# Patient Record
Sex: MALE | Race: WHITE | Employment: UNEMPLOYED | URBAN - METROPOLITAN AREA
[De-identification: names, ages, dates, MRNs, and addresses within clinical notes are randomized per-mention and may not be internally consistent; named-entity substitution may affect disease eponyms.]

---

## 2018-10-08 ENCOUNTER — HOSPITAL ENCOUNTER (OUTPATIENT)
Dept: CT IMAGING | Age: 53
Discharge: HOME | End: 2018-10-08
Attending: SURGERY
Payer: COMMERCIAL

## 2018-10-08 DIAGNOSIS — I70.222 ATHEROSCLEROSIS OF NATIVE ARTERY OF LEFT LOWER EXTREMITY WITH REST PAIN (HCC): ICD-10-CM

## 2018-10-08 DIAGNOSIS — I99.8 ISCHEMIC: ICD-10-CM

## 2018-10-08 LAB — CREAT BLD-MCNC: 1.1 MG/DL (ref 0.8–1.5)

## 2018-10-08 PROCEDURE — 75635 CT ANGIO ABDOMINAL ARTERIES: CPT

## 2018-10-08 PROCEDURE — 82565 ASSAY OF CREATININE: CPT

## 2018-10-08 PROCEDURE — 74011000258 HC RX REV CODE- 258: Performed by: SURGERY

## 2018-10-08 PROCEDURE — 74011636320 HC RX REV CODE- 636/320: Performed by: SURGERY

## 2018-10-08 RX ORDER — SODIUM CHLORIDE 0.9 % (FLUSH) 0.9 %
10 SYRINGE (ML) INJECTION
Status: COMPLETED | OUTPATIENT
Start: 2018-10-08 | End: 2018-10-08

## 2018-10-08 RX ADMIN — IOPAMIDOL 125 ML: 755 INJECTION, SOLUTION INTRAVENOUS at 17:01

## 2018-10-08 RX ADMIN — SODIUM CHLORIDE 100 ML: 900 INJECTION, SOLUTION INTRAVENOUS at 17:01

## 2018-10-08 RX ADMIN — Medication 10 ML: at 17:01

## 2018-10-15 RX ORDER — SODIUM CHLORIDE 0.9 % (FLUSH) 0.9 %
5-10 SYRINGE (ML) INJECTION AS NEEDED
Status: CANCELLED | OUTPATIENT
Start: 2018-10-15

## 2018-10-15 RX ORDER — CEFAZOLIN SODIUM/WATER 2 G/20 ML
2 SYRINGE (ML) INTRAVENOUS ONCE
Status: CANCELLED | OUTPATIENT
Start: 2018-10-15 | End: 2018-10-15

## 2018-10-19 ENCOUNTER — HOSPITAL ENCOUNTER (OUTPATIENT)
Dept: SURGERY | Age: 53
Discharge: HOME | End: 2018-10-19
Payer: COMMERCIAL

## 2018-10-19 ENCOUNTER — ANESTHESIA EVENT (OUTPATIENT)
Dept: SURGERY | Age: 53
DRG: 169 | End: 2018-10-19
Payer: COMMERCIAL

## 2018-10-19 VITALS
RESPIRATION RATE: 20 BRPM | SYSTOLIC BLOOD PRESSURE: 151 MMHG | BODY MASS INDEX: 24.1 KG/M2 | HEART RATE: 56 BPM | OXYGEN SATURATION: 97 % | HEIGHT: 68 IN | WEIGHT: 159 LBS | DIASTOLIC BLOOD PRESSURE: 87 MMHG | TEMPERATURE: 98 F

## 2018-10-19 LAB
ANION GAP SERPL CALC-SCNC: 5 MMOL/L (ref 7–16)
ATRIAL RATE: 50 BPM
BUN SERPL-MCNC: 13 MG/DL (ref 6–23)
CALCIUM SERPL-MCNC: 8.7 MG/DL (ref 8.3–10.4)
CALCULATED P AXIS, ECG09: 71 DEGREES
CALCULATED R AXIS, ECG10: 66 DEGREES
CALCULATED T AXIS, ECG11: 65 DEGREES
CHLORIDE SERPL-SCNC: 105 MMOL/L (ref 98–107)
CO2 SERPL-SCNC: 29 MMOL/L (ref 21–32)
CREAT SERPL-MCNC: 0.97 MG/DL (ref 0.8–1.5)
DIAGNOSIS, 93000: NORMAL
ERYTHROCYTE [DISTWIDTH] IN BLOOD BY AUTOMATED COUNT: 12.7 %
GLUCOSE SERPL-MCNC: 86 MG/DL (ref 65–100)
HCT VFR BLD AUTO: 46.6 % (ref 41.1–50.3)
HGB BLD-MCNC: 15.1 G/DL (ref 13.6–17.2)
MCH RBC QN AUTO: 29.7 PG (ref 26.1–32.9)
MCHC RBC AUTO-ENTMCNC: 32.4 G/DL (ref 31.4–35)
MCV RBC AUTO: 91.7 FL (ref 79.6–97.8)
NRBC # BLD: 0 K/UL (ref 0–0.2)
P-R INTERVAL, ECG05: 136 MS
PLATELET # BLD AUTO: 240 K/UL (ref 150–450)
PMV BLD AUTO: 10 FL (ref 9.4–12.3)
POTASSIUM SERPL-SCNC: 4.8 MMOL/L (ref 3.5–5.1)
Q-T INTERVAL, ECG07: 470 MS
QRS DURATION, ECG06: 88 MS
QTC CALCULATION (BEZET), ECG08: 428 MS
RBC # BLD AUTO: 5.08 M/UL (ref 4.23–5.6)
SODIUM SERPL-SCNC: 139 MMOL/L (ref 136–145)
VENTRICULAR RATE, ECG03: 50 BPM
WBC # BLD AUTO: 8.2 K/UL (ref 4.3–11.1)

## 2018-10-19 PROCEDURE — 93005 ELECTROCARDIOGRAM TRACING: CPT | Performed by: ANESTHESIOLOGY

## 2018-10-19 PROCEDURE — 85027 COMPLETE CBC AUTOMATED: CPT

## 2018-10-19 PROCEDURE — 80048 BASIC METABOLIC PNL TOTAL CA: CPT

## 2018-10-19 NOTE — ANESTHESIA PREPROCEDURE EVALUATION
Anesthetic History Review of Systems / Medical History Patient summary reviewed, nursing notes reviewed and pertinent labs reviewed Pulmonary COPD (chronic bronchitis): moderate Smoker (2ppd; now down to 6 cigarettes qd) Neuro/Psych  
 
 
CVA (approx 18 months ago. L hemiparesis improving.) Cardiovascular Hypertension Hyperlipidemia Exercise tolerance: <4 METS: Limited by claudication Comments: Echo 2017 - normal EF, no sig valve dysfxn Carotid duplex 2017 - no sig stenosis GI/Hepatic/Renal 
  
 
 
 
 
 
 Endo/Other Other Findings Physical Exam 
 
Airway Mallampati: I 
TM Distance: > 6 cm Neck ROM: normal range of motion Mouth opening: Normal 
 
 Cardiovascular Regular rate and rhythm,  S1 and S2 normal,  no murmur, click, rub, or gallop Dental 
 
Dentition: Full lower dentures and Full upper dentures Pulmonary Breath sounds clear to auscultation Abdominal 
GI exam deferred Other Findings Anesthetic Plan ASA: 3 Anesthesia type: general 
 
Monitoring Plan: Arterial line Post-op pain plan if not by surgeon: indwelling epidural catheter Anesthetic plan and risks discussed with: Patient Girlfriend present

## 2018-10-19 NOTE — PERIOP NOTES
Patient verified name and . Patient provided medical/health information and PTA medications to the best of their ability. TYPE  CASE:3  Order for consent was found in EHR and matches case posting. Labs per surgeon:cbc, bmp-- these collected today and sent to lab. Labs per anesthesia protocol: type and screen dos-- order placed in cc  EKG  :  today  Dr Ricks Primer in to see pt  Patient provided with and instructed on education handouts including Guide to Surgery, blood transfusions, pain management, and hand hygiene for the family and community, and [facility] brochure. dawnmist and hibiclens and instructions given per hospital policy. Instructed patient to continue previous medications as prescribed prior to surgery unless otherwise directed and to take the following medications the day of surgery according to anesthesia guidelines : baclofen, celexa, klonopin, norco, lopressor,  . Instructed patient to hold  the following medications: OF NOTE--PT TAKING  MG-- PER DR YORK ORDERS SET IN CC CONTINUE ASA-- BUT UNSURE IF HE IS AWARE THAT IT  MG-- CALLED OFFICE SPOKE TO Frankey Huff M.D. IN SURG BUT SHE WOULD FIND OUT AND CALL PT AND EITHER CALL ME OR PLACE NOTE IN CC---- G' FRIEND WITH PT--- MADE AWARE IF THEY HAD NOT HEARD BY 1400 TODAY TO CALL OFFICE AGAIN--- # GIVEN TO PT AND G' FRIEND. Original medication prescription bottles WAS  visualized during patient appointment. Patient teach back successful and patient demonstrates knowledge of instruction.

## 2018-10-19 NOTE — PERIOP NOTES
Melody Medrano r.n. rec'd call from Maria R from dr Mary Kasper office--dr tarah molina with pt staying on asa 325 mg daily--- called pt's g' friend-- best contact #-- left detail message and our # as return call if needed

## 2018-10-25 RX ORDER — HYDROMORPHONE HYDROCHLORIDE 2 MG/ML
0.5 INJECTION, SOLUTION INTRAMUSCULAR; INTRAVENOUS; SUBCUTANEOUS
Status: CANCELLED | OUTPATIENT
Start: 2018-10-25

## 2018-10-25 RX ORDER — SODIUM CHLORIDE, SODIUM LACTATE, POTASSIUM CHLORIDE, CALCIUM CHLORIDE 600; 310; 30; 20 MG/100ML; MG/100ML; MG/100ML; MG/100ML
75 INJECTION, SOLUTION INTRAVENOUS CONTINUOUS
Status: CANCELLED | OUTPATIENT
Start: 2018-10-25

## 2018-10-25 RX ORDER — OXYCODONE HYDROCHLORIDE 5 MG/1
10 TABLET ORAL
Status: CANCELLED | OUTPATIENT
Start: 2018-10-25 | End: 2018-10-26

## 2018-10-25 RX ORDER — FLUMAZENIL 0.1 MG/ML
0.2 INJECTION INTRAVENOUS AS NEEDED
Status: CANCELLED | OUTPATIENT
Start: 2018-10-25

## 2018-10-25 RX ORDER — DIPHENHYDRAMINE HYDROCHLORIDE 50 MG/ML
12.5 INJECTION, SOLUTION INTRAMUSCULAR; INTRAVENOUS
Status: CANCELLED | OUTPATIENT
Start: 2018-10-25

## 2018-10-25 RX ORDER — NALOXONE HYDROCHLORIDE 0.4 MG/ML
0.1 INJECTION, SOLUTION INTRAMUSCULAR; INTRAVENOUS; SUBCUTANEOUS
Status: CANCELLED | OUTPATIENT
Start: 2018-10-25

## 2018-10-25 RX ORDER — OXYCODONE HYDROCHLORIDE 5 MG/1
5 TABLET ORAL
Status: CANCELLED | OUTPATIENT
Start: 2018-10-25 | End: 2018-10-26

## 2018-10-26 ENCOUNTER — HOSPITAL ENCOUNTER (INPATIENT)
Age: 53
LOS: 5 days | Discharge: HOME | DRG: 169 | End: 2018-10-31
Attending: SURGERY | Admitting: SURGERY
Payer: COMMERCIAL

## 2018-10-26 ENCOUNTER — ANESTHESIA (OUTPATIENT)
Dept: SURGERY | Age: 53
DRG: 169 | End: 2018-10-26
Payer: COMMERCIAL

## 2018-10-26 DIAGNOSIS — Z95.828 S/P AORTOBIFEMORAL BYPASS SURGERY: Primary | ICD-10-CM

## 2018-10-26 PROBLEM — I73.9 PAD (PERIPHERAL ARTERY DISEASE) (HCC): Status: ACTIVE | Noted: 2018-10-26

## 2018-10-26 LAB
ABO + RH BLD: NORMAL
ACT BLD: 114 SECS (ref 70–128)
ACT BLD: 180 SECS (ref 70–128)
ANION GAP SERPL CALC-SCNC: 9 MMOL/L (ref 7–16)
APTT PPP: 30.2 SEC (ref 23.2–35.3)
BASE DEFICIT BLD-SCNC: 2 MMOL/L
BASE DEFICIT BLD-SCNC: 3 MMOL/L
BLOOD GROUP ANTIBODIES SERPL: NORMAL
BUN SERPL-MCNC: 18 MG/DL (ref 6–23)
CA-I BLD-MCNC: 1.14 MMOL/L (ref 1.12–1.32)
CA-I BLD-MCNC: 1.19 MMOL/L (ref 1.12–1.32)
CALCIUM SERPL-MCNC: 7.7 MG/DL (ref 8.3–10.4)
CHLORIDE SERPL-SCNC: 108 MMOL/L (ref 98–107)
CO2 SERPL-SCNC: 23 MMOL/L (ref 21–32)
CREAT SERPL-MCNC: 0.98 MG/DL (ref 0.8–1.5)
ERYTHROCYTE [DISTWIDTH] IN BLOOD BY AUTOMATED COUNT: 12.8 %
GLUCOSE BLD STRIP.AUTO-MCNC: 103 MG/DL (ref 65–100)
GLUCOSE BLD STRIP.AUTO-MCNC: 121 MG/DL (ref 65–100)
GLUCOSE SERPL-MCNC: 156 MG/DL (ref 65–100)
HCO3 BLD-SCNC: 24 MMOL/L (ref 22–26)
HCO3 BLD-SCNC: 24.3 MMOL/L (ref 22–26)
HCT VFR BLD AUTO: 38.7 % (ref 41.1–50.3)
HGB BLD-MCNC: 12.6 G/DL (ref 13.6–17.2)
INR PPP: 1
MAGNESIUM SERPL-MCNC: 1.8 MG/DL (ref 1.8–2.4)
MCH RBC QN AUTO: 29.5 PG (ref 26.1–32.9)
MCHC RBC AUTO-ENTMCNC: 32.6 G/DL (ref 31.4–35)
MCV RBC AUTO: 90.6 FL (ref 79.6–97.8)
NRBC # BLD: 0 K/UL (ref 0–0.2)
PCO2 BLD: 46 MMHG (ref 35–45)
PCO2 BLD: 50 MMHG (ref 35–45)
PH BLD: 7.29 [PH] (ref 7.35–7.45)
PH BLD: 7.33 [PH] (ref 7.35–7.45)
PLATELET # BLD AUTO: 239 K/UL (ref 150–450)
PMV BLD AUTO: 9.8 FL (ref 9.4–12.3)
PO2 BLD: 135 MMHG (ref 75–100)
PO2 BLD: 152 MMHG (ref 75–100)
POTASSIUM BLD-SCNC: 4.7 MMOL/L (ref 3.5–5.1)
POTASSIUM BLD-SCNC: 5.2 MMOL/L (ref 3.5–5.1)
POTASSIUM SERPL-SCNC: 3.8 MMOL/L (ref 3.5–5.1)
PROTHROMBIN TIME: 12.8 SEC (ref 11.5–14.5)
RBC # BLD AUTO: 4.27 M/UL (ref 4.23–5.6)
SAO2 % BLD: 99 % (ref 95–98)
SAO2 % BLD: 99 % (ref 95–98)
SODIUM BLD-SCNC: 139 MMOL/L (ref 136–145)
SODIUM BLD-SCNC: 140 MMOL/L (ref 136–145)
SODIUM SERPL-SCNC: 140 MMOL/L (ref 136–145)
SPECIMEN EXP DATE BLD: NORMAL
WBC # BLD AUTO: 15.4 K/UL (ref 4.3–11.1)

## 2018-10-26 PROCEDURE — 77030007880 HC KT SPN EPDRL BBMI -B: Performed by: ANESTHESIOLOGY

## 2018-10-26 PROCEDURE — 77030018548 HC SUT ETHBND2 J&J -B: Performed by: SURGERY

## 2018-10-26 PROCEDURE — P9045 ALBUMIN (HUMAN), 5%, 250 ML: HCPCS

## 2018-10-26 PROCEDURE — 74011250636 HC RX REV CODE- 250/636: Performed by: SURGERY

## 2018-10-26 PROCEDURE — 77030037400 HC ADH TISS HI VISC EXOFIN CHMP -B: Performed by: SURGERY

## 2018-10-26 PROCEDURE — 04100JK BYPASS ABDOMINAL AORTA TO BILATERAL FEMORAL ARTERIES WITH SYNTHETIC SUBSTITUTE, OPEN APPROACH: ICD-10-PCS | Performed by: SURGERY

## 2018-10-26 PROCEDURE — 85027 COMPLETE CBC AUTOMATED: CPT

## 2018-10-26 PROCEDURE — 77030020751 HC FLTR TBNG TRNSFUS HAEM -A: Performed by: ANESTHESIOLOGY

## 2018-10-26 PROCEDURE — 76060000040 HC ANESTHESIA 4.5 TO 5 HR: Performed by: SURGERY

## 2018-10-26 PROCEDURE — 74011000250 HC RX REV CODE- 250: Performed by: SURGERY

## 2018-10-26 PROCEDURE — 82803 BLOOD GASES ANY COMBINATION: CPT

## 2018-10-26 PROCEDURE — 74011000258 HC RX REV CODE- 258: Performed by: SURGERY

## 2018-10-26 PROCEDURE — 77030039266 HC ADH SKN EXOFIN S2SG -A: Performed by: SURGERY

## 2018-10-26 PROCEDURE — 77030003544 HC NDL EPDRL BD -A: Performed by: ANESTHESIOLOGY

## 2018-10-26 PROCEDURE — 74011250636 HC RX REV CODE- 250/636

## 2018-10-26 PROCEDURE — C1768 GRAFT, VASCULAR: HCPCS | Performed by: SURGERY

## 2018-10-26 PROCEDURE — 77030019940 HC BLNKT HYPOTHRM STRY -B: Performed by: ANESTHESIOLOGY

## 2018-10-26 PROCEDURE — 77030010512 HC APPL CLP LIG J&J -C: Performed by: SURGERY

## 2018-10-26 PROCEDURE — 77030020407 HC IV BLD WRMR ST 3M -A: Performed by: ANESTHESIOLOGY

## 2018-10-26 PROCEDURE — 77030005515 HC CATH URETH FOL14 BARD -B: Performed by: SURGERY

## 2018-10-26 PROCEDURE — 77030020230: Performed by: ANESTHESIOLOGY

## 2018-10-26 PROCEDURE — 77030011264 HC ELECTRD BLD EXT COVD -A: Performed by: SURGERY

## 2018-10-26 PROCEDURE — 77030019908 HC STETH ESOPH SIMS -A: Performed by: ANESTHESIOLOGY

## 2018-10-26 PROCEDURE — 77030014125 HC TY EPDRL BBMI -B: Performed by: ANESTHESIOLOGY

## 2018-10-26 PROCEDURE — 74011250637 HC RX REV CODE- 250/637: Performed by: ANESTHESIOLOGY

## 2018-10-26 PROCEDURE — 77030012317 HC CATH URET INT COVD -A: Performed by: SURGERY

## 2018-10-26 PROCEDURE — 77030020788: Performed by: SURGERY

## 2018-10-26 PROCEDURE — 77030002996 HC SUT SLK J&J -A: Performed by: SURGERY

## 2018-10-26 PROCEDURE — 77030002970 HC SUT PLEDG TELE -A: Performed by: SURGERY

## 2018-10-26 PROCEDURE — 77030008467 HC STPLR SKN COVD -B: Performed by: SURGERY

## 2018-10-26 PROCEDURE — 77030031139 HC SUT VCRL2 J&J -A: Performed by: SURGERY

## 2018-10-26 PROCEDURE — 77030008477 HC STYL SATN SLP COVD -A: Performed by: ANESTHESIOLOGY

## 2018-10-26 PROCEDURE — 77030039425 HC BLD LARYNG TRULITE DISP TELE -A: Performed by: ANESTHESIOLOGY

## 2018-10-26 PROCEDURE — 76010000965 HC SPECIAL PROCEDURE PERI-OP: Performed by: SURGERY

## 2018-10-26 PROCEDURE — 77030002987 HC SUT PROL J&J -B: Performed by: SURGERY

## 2018-10-26 PROCEDURE — 77030013292 HC BOWL MX PRSM J&J -A: Performed by: ANESTHESIOLOGY

## 2018-10-26 PROCEDURE — 76010000199 HC CV SURG 4.5 TO 5 HR INTENSV-TIER 1: Performed by: SURGERY

## 2018-10-26 PROCEDURE — 77030034850: Performed by: SURGERY

## 2018-10-26 PROCEDURE — 77030002986 HC SUT PROL J&J -A: Performed by: SURGERY

## 2018-10-26 PROCEDURE — 83735 ASSAY OF MAGNESIUM: CPT

## 2018-10-26 PROCEDURE — 77030018673: Performed by: SURGERY

## 2018-10-26 PROCEDURE — 77030013794 HC KT TRNSDUC BLD EDWD -B: Performed by: ANESTHESIOLOGY

## 2018-10-26 PROCEDURE — 85730 THROMBOPLASTIN TIME PARTIAL: CPT

## 2018-10-26 PROCEDURE — 36600 WITHDRAWAL OF ARTERIAL BLOOD: CPT

## 2018-10-26 PROCEDURE — 74011000250 HC RX REV CODE- 250

## 2018-10-26 PROCEDURE — 77030020782 HC GWN BAIR PAWS FLX 3M -B: Performed by: ANESTHESIOLOGY

## 2018-10-26 PROCEDURE — 77030002966 HC SUT PDS J&J -A: Performed by: SURGERY

## 2018-10-26 PROCEDURE — 85610 PROTHROMBIN TIME: CPT

## 2018-10-26 PROCEDURE — 84295 ASSAY OF SERUM SODIUM: CPT

## 2018-10-26 PROCEDURE — 74011250636 HC RX REV CODE- 250/636: Performed by: ANESTHESIOLOGY

## 2018-10-26 PROCEDURE — 77030011943: Performed by: SURGERY

## 2018-10-26 PROCEDURE — 65610000006 HC RM INTENSIVE CARE

## 2018-10-26 PROCEDURE — 77030014008 HC SPNG HEMSTAT J&J -C: Performed by: SURGERY

## 2018-10-26 PROCEDURE — 86901 BLOOD TYPING SEROLOGIC RH(D): CPT

## 2018-10-26 PROCEDURE — 85347 COAGULATION TIME ACTIVATED: CPT

## 2018-10-26 PROCEDURE — 77030018571 HC SUT PROL1 J&J -B: Performed by: SURGERY

## 2018-10-26 PROCEDURE — 77030034888 HC SUT PROL 2 J&J -B: Performed by: SURGERY

## 2018-10-26 PROCEDURE — 80048 BASIC METABOLIC PNL TOTAL CA: CPT

## 2018-10-26 PROCEDURE — 77030008703 HC TU ET UNCUF COVD -A: Performed by: ANESTHESIOLOGY

## 2018-10-26 DEVICE — GELSOFT GELATIN IMPREGNATED KNITTED VASCULAR PROSTHESIS BIFURCATE
Type: IMPLANTABLE DEVICE | Site: AORTA | Status: FUNCTIONAL
Brand: GELSOFT™

## 2018-10-26 RX ORDER — DOPAMINE HYDROCHLORIDE 320 MG/100ML
5 INJECTION, SOLUTION INTRAVENOUS
Status: DISCONTINUED | OUTPATIENT
Start: 2018-10-26 | End: 2018-10-28

## 2018-10-26 RX ORDER — NALOXONE HYDROCHLORIDE 0.4 MG/ML
0.4 INJECTION, SOLUTION INTRAMUSCULAR; INTRAVENOUS; SUBCUTANEOUS AS NEEDED
Status: DISCONTINUED | OUTPATIENT
Start: 2018-10-26 | End: 2018-10-31 | Stop reason: HOSPADM

## 2018-10-26 RX ORDER — SODIUM CHLORIDE 0.9 % (FLUSH) 0.9 %
5-10 SYRINGE (ML) INJECTION AS NEEDED
Status: DISCONTINUED | OUTPATIENT
Start: 2018-10-26 | End: 2018-10-31

## 2018-10-26 RX ORDER — KETOROLAC TROMETHAMINE 30 MG/ML
15 INJECTION, SOLUTION INTRAMUSCULAR; INTRAVENOUS EVERY 6 HOURS
Status: COMPLETED | OUTPATIENT
Start: 2018-10-26 | End: 2018-10-27

## 2018-10-26 RX ORDER — KETOROLAC TROMETHAMINE 15 MG/ML
15 INJECTION, SOLUTION INTRAMUSCULAR; INTRAVENOUS
Status: ACTIVE | OUTPATIENT
Start: 2018-10-26 | End: 2018-10-27

## 2018-10-26 RX ORDER — HEPARIN SODIUM 1000 [USP'U]/ML
INJECTION, SOLUTION INTRAVENOUS; SUBCUTANEOUS AS NEEDED
Status: DISCONTINUED | OUTPATIENT
Start: 2018-10-26 | End: 2018-10-26 | Stop reason: HOSPADM

## 2018-10-26 RX ORDER — DEXTROSE MONOHYDRATE AND SODIUM CHLORIDE 5; .45 G/100ML; G/100ML
1000 INJECTION, SOLUTION INTRAVENOUS CONTINUOUS
Status: DISCONTINUED | OUTPATIENT
Start: 2018-10-26 | End: 2018-10-27

## 2018-10-26 RX ORDER — MAGNESIUM SULFATE 1 G/100ML
1 INJECTION INTRAVENOUS AS NEEDED
Status: DISCONTINUED | OUTPATIENT
Start: 2018-10-26 | End: 2018-10-31 | Stop reason: HOSPADM

## 2018-10-26 RX ORDER — EPHEDRINE SULFATE 50 MG/ML
INJECTION, SOLUTION INTRAVENOUS AS NEEDED
Status: DISCONTINUED | OUTPATIENT
Start: 2018-10-26 | End: 2018-10-26 | Stop reason: HOSPADM

## 2018-10-26 RX ORDER — LIDOCAINE HYDROCHLORIDE 20 MG/ML
INJECTION, SOLUTION EPIDURAL; INFILTRATION; INTRACAUDAL; PERINEURAL AS NEEDED
Status: DISCONTINUED | OUTPATIENT
Start: 2018-10-26 | End: 2018-10-26 | Stop reason: HOSPADM

## 2018-10-26 RX ORDER — DEXAMETHASONE SODIUM PHOSPHATE 4 MG/ML
INJECTION, SOLUTION INTRA-ARTICULAR; INTRALESIONAL; INTRAMUSCULAR; INTRAVENOUS; SOFT TISSUE AS NEEDED
Status: DISCONTINUED | OUTPATIENT
Start: 2018-10-26 | End: 2018-10-26 | Stop reason: HOSPADM

## 2018-10-26 RX ORDER — MIDAZOLAM HYDROCHLORIDE 1 MG/ML
2 INJECTION, SOLUTION INTRAMUSCULAR; INTRAVENOUS ONCE
Status: COMPLETED | OUTPATIENT
Start: 2018-10-26 | End: 2018-10-26

## 2018-10-26 RX ORDER — NALOXONE HYDROCHLORIDE 0.4 MG/ML
0.1 INJECTION, SOLUTION INTRAMUSCULAR; INTRAVENOUS; SUBCUTANEOUS AS NEEDED
Status: DISCONTINUED | OUTPATIENT
Start: 2018-10-26 | End: 2018-10-31 | Stop reason: HOSPADM

## 2018-10-26 RX ORDER — GLYCOPYRROLATE 0.2 MG/ML
INJECTION INTRAMUSCULAR; INTRAVENOUS AS NEEDED
Status: DISCONTINUED | OUTPATIENT
Start: 2018-10-26 | End: 2018-10-26 | Stop reason: HOSPADM

## 2018-10-26 RX ORDER — HYDROMORPHONE HYDROCHLORIDE 1 MG/ML
0.25 INJECTION, SOLUTION INTRAMUSCULAR; INTRAVENOUS; SUBCUTANEOUS
Status: DISCONTINUED | OUTPATIENT
Start: 2018-10-26 | End: 2018-10-31 | Stop reason: HOSPADM

## 2018-10-26 RX ORDER — NEOSTIGMINE METHYLSULFATE 1 MG/ML
INJECTION INTRAVENOUS AS NEEDED
Status: DISCONTINUED | OUTPATIENT
Start: 2018-10-26 | End: 2018-10-26 | Stop reason: HOSPADM

## 2018-10-26 RX ORDER — FENTANYL CITRATE 50 UG/ML
INJECTION, SOLUTION INTRAMUSCULAR; INTRAVENOUS AS NEEDED
Status: DISCONTINUED | OUTPATIENT
Start: 2018-10-26 | End: 2018-10-26 | Stop reason: HOSPADM

## 2018-10-26 RX ORDER — SODIUM CHLORIDE 9 MG/ML
INJECTION, SOLUTION INTRAVENOUS
Status: DISCONTINUED | OUTPATIENT
Start: 2018-10-26 | End: 2018-10-26 | Stop reason: HOSPADM

## 2018-10-26 RX ORDER — OXYCODONE HYDROCHLORIDE 5 MG/1
10 TABLET ORAL
Status: DISCONTINUED | OUTPATIENT
Start: 2018-10-26 | End: 2018-10-26

## 2018-10-26 RX ORDER — METOPROLOL TARTRATE 5 MG/5ML
5 INJECTION INTRAVENOUS EVERY 6 HOURS
Status: DISCONTINUED | OUTPATIENT
Start: 2018-10-26 | End: 2018-10-31 | Stop reason: SDUPTHER

## 2018-10-26 RX ORDER — CEFAZOLIN SODIUM/WATER 2 G/20 ML
2 SYRINGE (ML) INTRAVENOUS ONCE
Status: COMPLETED | OUTPATIENT
Start: 2018-10-26 | End: 2018-10-26

## 2018-10-26 RX ORDER — SODIUM CHLORIDE 0.9 % (FLUSH) 0.9 %
5-10 SYRINGE (ML) INJECTION AS NEEDED
Status: DISCONTINUED | OUTPATIENT
Start: 2018-10-26 | End: 2018-10-31 | Stop reason: HOSPADM

## 2018-10-26 RX ORDER — PROPOFOL 10 MG/ML
INJECTION, EMULSION INTRAVENOUS AS NEEDED
Status: DISCONTINUED | OUTPATIENT
Start: 2018-10-26 | End: 2018-10-26 | Stop reason: HOSPADM

## 2018-10-26 RX ORDER — ONDANSETRON 2 MG/ML
4 INJECTION INTRAMUSCULAR; INTRAVENOUS
Status: DISPENSED | OUTPATIENT
Start: 2018-10-26 | End: 2018-10-29

## 2018-10-26 RX ORDER — MIDAZOLAM HYDROCHLORIDE 1 MG/ML
2 INJECTION, SOLUTION INTRAMUSCULAR; INTRAVENOUS
Status: COMPLETED | OUTPATIENT
Start: 2018-10-26 | End: 2018-10-26

## 2018-10-26 RX ORDER — ONDANSETRON 2 MG/ML
4 INJECTION INTRAMUSCULAR; INTRAVENOUS
Status: DISCONTINUED | OUTPATIENT
Start: 2018-10-26 | End: 2018-10-31 | Stop reason: HOSPADM

## 2018-10-26 RX ORDER — BUPIVACAINE HYDROCHLORIDE 2.5 MG/ML
INJECTION, SOLUTION EPIDURAL; INFILTRATION; INTRACAUDAL AS NEEDED
Status: DISCONTINUED | OUTPATIENT
Start: 2018-10-26 | End: 2018-10-26 | Stop reason: HOSPADM

## 2018-10-26 RX ORDER — HYDROMORPHONE HYDROCHLORIDE 1 MG/ML
0.5 INJECTION, SOLUTION INTRAMUSCULAR; INTRAVENOUS; SUBCUTANEOUS
Status: DISCONTINUED | OUTPATIENT
Start: 2018-10-26 | End: 2018-10-31 | Stop reason: HOSPADM

## 2018-10-26 RX ORDER — ALBUMIN HUMAN 50 G/1000ML
SOLUTION INTRAVENOUS AS NEEDED
Status: DISCONTINUED | OUTPATIENT
Start: 2018-10-26 | End: 2018-10-26 | Stop reason: HOSPADM

## 2018-10-26 RX ORDER — HEPARIN SODIUM 5000 [USP'U]/ML
INJECTION, SOLUTION INTRAVENOUS; SUBCUTANEOUS AS NEEDED
Status: DISCONTINUED | OUTPATIENT
Start: 2018-10-26 | End: 2018-10-26 | Stop reason: HOSPADM

## 2018-10-26 RX ORDER — LIDOCAINE HYDROCHLORIDE AND EPINEPHRINE 15; 5 MG/ML; UG/ML
INJECTION, SOLUTION EPIDURAL
Status: COMPLETED | OUTPATIENT
Start: 2018-10-26 | End: 2018-10-26

## 2018-10-26 RX ORDER — ROCURONIUM BROMIDE 10 MG/ML
INJECTION, SOLUTION INTRAVENOUS AS NEEDED
Status: DISCONTINUED | OUTPATIENT
Start: 2018-10-26 | End: 2018-10-26 | Stop reason: HOSPADM

## 2018-10-26 RX ORDER — SODIUM CHLORIDE, SODIUM LACTATE, POTASSIUM CHLORIDE, CALCIUM CHLORIDE 600; 310; 30; 20 MG/100ML; MG/100ML; MG/100ML; MG/100ML
75 INJECTION, SOLUTION INTRAVENOUS CONTINUOUS
Status: DISCONTINUED | OUTPATIENT
Start: 2018-10-26 | End: 2018-10-26

## 2018-10-26 RX ORDER — LIDOCAINE HYDROCHLORIDE 10 MG/ML
0.1 INJECTION INFILTRATION; PERINEURAL AS NEEDED
Status: DISCONTINUED | OUTPATIENT
Start: 2018-10-26 | End: 2018-10-31 | Stop reason: HOSPADM

## 2018-10-26 RX ORDER — CEFAZOLIN SODIUM 1 G/3ML
INJECTION, POWDER, FOR SOLUTION INTRAMUSCULAR; INTRAVENOUS AS NEEDED
Status: DISCONTINUED | OUTPATIENT
Start: 2018-10-26 | End: 2018-10-26 | Stop reason: HOSPADM

## 2018-10-26 RX ORDER — ONDANSETRON 2 MG/ML
INJECTION INTRAMUSCULAR; INTRAVENOUS AS NEEDED
Status: DISCONTINUED | OUTPATIENT
Start: 2018-10-26 | End: 2018-10-26 | Stop reason: HOSPADM

## 2018-10-26 RX ORDER — GABAPENTIN 300 MG/1
600 CAPSULE ORAL ONCE
Status: COMPLETED | OUTPATIENT
Start: 2018-10-26 | End: 2018-10-26

## 2018-10-26 RX ORDER — PROTAMINE SULFATE 10 MG/ML
INJECTION, SOLUTION INTRAVENOUS AS NEEDED
Status: DISCONTINUED | OUTPATIENT
Start: 2018-10-26 | End: 2018-10-26 | Stop reason: HOSPADM

## 2018-10-26 RX ORDER — SODIUM CHLORIDE 0.9 % (FLUSH) 0.9 %
5-10 SYRINGE (ML) INJECTION EVERY 8 HOURS
Status: DISCONTINUED | OUTPATIENT
Start: 2018-10-26 | End: 2018-10-31 | Stop reason: HOSPADM

## 2018-10-26 RX ORDER — FENTANYL CITRATE 50 UG/ML
100 INJECTION, SOLUTION INTRAMUSCULAR; INTRAVENOUS ONCE
Status: COMPLETED | OUTPATIENT
Start: 2018-10-26 | End: 2018-10-26

## 2018-10-26 RX ORDER — SODIUM CHLORIDE 0.9 % (FLUSH) 0.9 %
5-10 SYRINGE (ML) INJECTION EVERY 8 HOURS
Status: DISCONTINUED | OUTPATIENT
Start: 2018-10-26 | End: 2018-10-31

## 2018-10-26 RX ADMIN — FENTANYL CITRATE 25 MCG: 50 INJECTION, SOLUTION INTRAMUSCULAR; INTRAVENOUS at 07:30

## 2018-10-26 RX ADMIN — HEPARIN SODIUM 5000 UNITS: 1000 INJECTION, SOLUTION INTRAVENOUS; SUBCUTANEOUS at 10:32

## 2018-10-26 RX ADMIN — Medication 10 ML: at 21:08

## 2018-10-26 RX ADMIN — BUPIVACAINE HYDROCHLORIDE 2 ML: 2.5 INJECTION, SOLUTION EPIDURAL; INFILTRATION; INTRACAUDAL at 11:25

## 2018-10-26 RX ADMIN — KETOROLAC TROMETHAMINE 15 MG: 30 INJECTION, SOLUTION INTRAMUSCULAR at 17:32

## 2018-10-26 RX ADMIN — ONDANSETRON 4 MG: 2 INJECTION INTRAMUSCULAR; INTRAVENOUS at 08:16

## 2018-10-26 RX ADMIN — LIDOCAINE HYDROCHLORIDE AND EPINEPHRINE 3 ML: 15; 5 INJECTION, SOLUTION EPIDURAL at 07:02

## 2018-10-26 RX ADMIN — FENTANYL CITRATE 100 MCG: 50 INJECTION, SOLUTION INTRAMUSCULAR; INTRAVENOUS at 08:36

## 2018-10-26 RX ADMIN — ROCURONIUM BROMIDE 10 MG: 10 INJECTION, SOLUTION INTRAVENOUS at 09:00

## 2018-10-26 RX ADMIN — FENTANYL CITRATE 75 MCG: 50 INJECTION, SOLUTION INTRAMUSCULAR; INTRAVENOUS at 07:38

## 2018-10-26 RX ADMIN — ROCURONIUM BROMIDE 40 MG: 10 INJECTION, SOLUTION INTRAVENOUS at 07:39

## 2018-10-26 RX ADMIN — MIDAZOLAM HYDROCHLORIDE 1 MG: 2 INJECTION, SOLUTION INTRAMUSCULAR; INTRAVENOUS at 07:01

## 2018-10-26 RX ADMIN — DEXAMETHASONE SODIUM PHOSPHATE 4 MG: 4 INJECTION, SOLUTION INTRA-ARTICULAR; INTRALESIONAL; INTRAMUSCULAR; INTRAVENOUS; SOFT TISSUE at 08:16

## 2018-10-26 RX ADMIN — CEFAZOLIN SODIUM 2 G: 1 INJECTION, POWDER, FOR SOLUTION INTRAMUSCULAR; INTRAVENOUS at 11:18

## 2018-10-26 RX ADMIN — METOPROLOL TARTRATE 5 MG: 1 INJECTION, SOLUTION INTRAVENOUS at 17:31

## 2018-10-26 RX ADMIN — ROCURONIUM BROMIDE 10 MG: 10 INJECTION, SOLUTION INTRAVENOUS at 09:59

## 2018-10-26 RX ADMIN — GABAPENTIN 600 MG: 300 CAPSULE ORAL at 05:58

## 2018-10-26 RX ADMIN — BUPIVACAINE HYDROCHLORIDE 4 ML: 2.5 INJECTION, SOLUTION EPIDURAL; INFILTRATION; INTRACAUDAL at 11:15

## 2018-10-26 RX ADMIN — ROPIVACAINE HYDROCHLORIDE 6 MCG: 10 INJECTION, SOLUTION EPIDURAL at 11:41

## 2018-10-26 RX ADMIN — SODIUM CHLORIDE: 9 INJECTION, SOLUTION INTRAVENOUS at 07:43

## 2018-10-26 RX ADMIN — PROTAMINE SULFATE 20 MG: 10 INJECTION, SOLUTION INTRAVENOUS at 10:57

## 2018-10-26 RX ADMIN — PROPOFOL 120 MG: 10 INJECTION, EMULSION INTRAVENOUS at 07:38

## 2018-10-26 RX ADMIN — SODIUM CHLORIDE 1000 MG: 900 INJECTION, SOLUTION INTRAVENOUS at 18:51

## 2018-10-26 RX ADMIN — ROCURONIUM BROMIDE 10 MG: 10 INJECTION, SOLUTION INTRAVENOUS at 09:37

## 2018-10-26 RX ADMIN — HEPARIN SODIUM 7000 UNITS: 1000 INJECTION, SOLUTION INTRAVENOUS; SUBCUTANEOUS at 09:28

## 2018-10-26 RX ADMIN — EPHEDRINE SULFATE 10 MG: 50 INJECTION, SOLUTION INTRAVENOUS at 07:56

## 2018-10-26 RX ADMIN — NICARDIPINE HYDROCHLORIDE 5 MG/HR: 25 INJECTION INTRAVENOUS at 15:36

## 2018-10-26 RX ADMIN — FENTANYL CITRATE 100 MCG: 50 INJECTION, SOLUTION INTRAMUSCULAR; INTRAVENOUS at 08:48

## 2018-10-26 RX ADMIN — ROCURONIUM BROMIDE 10 MG: 10 INJECTION, SOLUTION INTRAVENOUS at 09:16

## 2018-10-26 RX ADMIN — HYDROMORPHONE HYDROCHLORIDE 0.25 MG: 2 INJECTION, SOLUTION INTRAMUSCULAR; INTRAVENOUS; SUBCUTANEOUS at 15:35

## 2018-10-26 RX ADMIN — PROTAMINE SULFATE 20 MG: 10 INJECTION, SOLUTION INTRAVENOUS at 10:58

## 2018-10-26 RX ADMIN — ALBUMIN HUMAN 250 ML: 50 SOLUTION INTRAVENOUS at 10:34

## 2018-10-26 RX ADMIN — ALBUMIN HUMAN 250 ML: 50 SOLUTION INTRAVENOUS at 10:06

## 2018-10-26 RX ADMIN — ROCURONIUM BROMIDE 20 MG: 10 INJECTION, SOLUTION INTRAVENOUS at 08:00

## 2018-10-26 RX ADMIN — ROPIVACAINE HYDROCHLORIDE 6 MCG: 10 INJECTION, SOLUTION EPIDURAL at 12:00

## 2018-10-26 RX ADMIN — EPHEDRINE SULFATE 5 MG: 50 INJECTION, SOLUTION INTRAVENOUS at 07:48

## 2018-10-26 RX ADMIN — GLYCOPYRROLATE 0.2 MG: 0.2 INJECTION INTRAMUSCULAR; INTRAVENOUS at 08:07

## 2018-10-26 RX ADMIN — MIDAZOLAM HYDROCHLORIDE 2 MG: 2 INJECTION, SOLUTION INTRAMUSCULAR; INTRAVENOUS at 06:49

## 2018-10-26 RX ADMIN — Medication 10 ML: at 14:00

## 2018-10-26 RX ADMIN — FENTANYL CITRATE 100 MCG: 50 INJECTION INTRAMUSCULAR; INTRAVENOUS at 06:49

## 2018-10-26 RX ADMIN — BUPIVACAINE HYDROCHLORIDE 4 ML: 2.5 INJECTION, SOLUTION EPIDURAL; INFILTRATION; INTRACAUDAL at 11:01

## 2018-10-26 RX ADMIN — DEXTROSE MONOHYDRATE AND SODIUM CHLORIDE 1000 ML: 5; .45 INJECTION, SOLUTION INTRAVENOUS at 13:00

## 2018-10-26 RX ADMIN — LIDOCAINE HYDROCHLORIDE 80 MG: 20 INJECTION, SOLUTION EPIDURAL; INFILTRATION; INTRACAUDAL; PERINEURAL at 07:38

## 2018-10-26 RX ADMIN — Medication 2 G: at 08:00

## 2018-10-26 RX ADMIN — SODIUM CHLORIDE: 9 INJECTION, SOLUTION INTRAVENOUS at 09:55

## 2018-10-26 RX ADMIN — SODIUM CHLORIDE, SODIUM LACTATE, POTASSIUM CHLORIDE, AND CALCIUM CHLORIDE 75 ML/HR: 600; 310; 30; 20 INJECTION, SOLUTION INTRAVENOUS at 05:57

## 2018-10-26 RX ADMIN — ROCURONIUM BROMIDE 20 MG: 10 INJECTION, SOLUTION INTRAVENOUS at 08:39

## 2018-10-26 RX ADMIN — FENTANYL CITRATE 100 MCG: 50 INJECTION, SOLUTION INTRAMUSCULAR; INTRAVENOUS at 08:55

## 2018-10-26 RX ADMIN — GLYCOPYRROLATE 0.8 MG: 0.2 INJECTION INTRAMUSCULAR; INTRAVENOUS at 11:34

## 2018-10-26 RX ADMIN — PROTAMINE SULFATE 10 MG: 10 INJECTION, SOLUTION INTRAVENOUS at 10:56

## 2018-10-26 RX ADMIN — NEOSTIGMINE METHYLSULFATE 5 MG: 1 INJECTION INTRAVENOUS at 11:34

## 2018-10-26 RX ADMIN — MAGNESIUM SULFATE HEPTAHYDRATE 1 G: 1 INJECTION, SOLUTION INTRAVENOUS at 17:32

## 2018-10-26 RX ADMIN — ROCURONIUM BROMIDE 5 MG: 10 INJECTION, SOLUTION INTRAVENOUS at 11:22

## 2018-10-26 RX ADMIN — EPHEDRINE SULFATE 5 MG: 50 INJECTION, SOLUTION INTRAVENOUS at 07:47

## 2018-10-26 NOTE — H&P
[address] [facility] [address], [address] [facility]. [address] [facility] FAX: [phone] Sara Castellon 1965 Referring physician: ERNESTINA Rice 
[address] [facility], [address] [facility] 
  
Reason for referral: Left leg ischemia 
  
Odilia Nuñez is a 46 y.o. male sent in consultation for Chief Complaint Patient presents with  New Patient  
    u/s arterial   
  
  
HPI: 58-year-old male that presents for evaluation of an ischemic left leg. He is also history of cigarette smoking and according to him he had a crush injury on the job where he feels like he occluded the femoral artery on the left. The details of his treatment for their own became fairly unclear however does sound like he was treated [facility] and had some stents placed in the left iliac system. He developed new discomfort and coolness as well as numbness in the left leg and foot mostly below the knee and came back for further evaluation. He had an ultrasound done this morning that demonstrated the left iliac system to be occluded. He complains primarily of numbness and also has pain in his left foot as well at rest. 
  
He also had what sounds like a right hemispheric stroke that left him with left hemiparesis with some residual symptoms on the left side and the left hand where he has a mild degree of clumsiness and he also has foot drop on the left. I believe that the foot drop was a result of the stroke rather than chronic ischemia. However despite that he is able to walk with the assistance of an AFO. He really continues to work. Unfortunately continues to smoke but he is cut down to what he describes about 4 cigarettes/day. He denies any cardiopulmonary disease or previous coronary issues. He lives in [place] he is accompanied today by his wife. 
  
  
ROS: 
  
Constitutional: Negative for fever and chills. HENT: Negative for congestion and sore throat. Skin: Negative for rash and itching. Eyes: Negative for blurred vision and double vision. Respiratory: Negative for cough and shortness of breath. Cardiovascular: Negative for chest pain, palpitations, claudication and leg swelling. Gastrointestinal: Negative for nausea, vomiting and abdominal pain. Genitourinary: Negative for dysuria, hematuria and flank pain. Musculoskeletal: Negative for joint pain and falls. Neurological: Negative for dizziness, sensory change, focal weakness, loss of consciousness and headaches.  
  
Medical history:  
    
Past Medical History:  
Diagnosis Date  Anxiety disorder    
 Chronic pain    
 CVA (cerebral vascular accident) ([facility] [address] 03/2017  Depression    
 Hernia, inguinal    
  Not repaired  Hypercholesterolemia    
 Hypertension    
 Psychotic disorder [facility])    
  
  
Surgical history:  
     
Past Surgical History:  
Procedure Laterality Date  VASCULAR SURGERY PROCEDURE UNLIST Left    
  LE stenting x4  
  
  
Allergies: No Known Allergies 
  
Current medications:  
     
Current Outpatient Prescriptions Medication Sig Dispense  aspirin (ASPIRIN) 325 mg tablet Take 325 mg by mouth daily.    
 atorvastatin (LIPITOR) 40 mg tablet Take  by mouth daily.    
 baclofen (LIORESAL) 10 mg tablet Take  by mouth three (3) times daily.    
 citalopram (CELEXA) 40 mg tablet Take 40 mg by mouth daily.    
 clonazePAM (KLONOPIN) 1 mg tablet Take  by mouth two (2) times a day.    
 cyclobenzaprine (FLEXERIL) 10 mg tablet Take  by mouth three (3) times daily as needed for Muscle Spasm(s).    
 HYDROcodone-acetaminophen (NORCO) 7.5-325 mg per tablet Take 1 Tab by mouth every eight (8) hours as needed for Pain.    
 lisinopril (PRINIVIL, ZESTRIL) 2.5 mg tablet Take 5 mg by mouth daily. Indications:  Take 1/2 tab AM and take 1 Tab Bedtime    
 metoprolol tartrate (LOPRESSOR) 25 mg tablet Take  by mouth two (2) times a day.    
  nitroglycerin (NITROSTAT) 0.4 mg SL tablet 0.4 mg by SubLINGual route every five (5) minutes as needed for Chest Pain (PRN). Up to 3 doses.    
  
No current facility-administered medications for this visit.   
  
  
Social history:  
   
History Smoking Status  Current Every Day Smoker Smokeless Tobacco  
 Never Used  
  
                         
   
History Alcohol use Not on file  
  
  
Imaging: See above, the arterial duplex imaging rates her resting ankle-brachial index of 0.7 on the left. 
  
Vitals:  
   
Vitals:  
  10/08/18 1412 Weight: 161 lb (73 kg) Height: 5' 6\" (1.676 m)  
  
  
Physical exam: 
    
Visit Vitals  Ht 5' 6\" (1.676 m)  Wt 161 lb (73 kg)  BMI 25.99 kg/m2  
  
General appearance: alert, cooperative, no distress, appears stated age Throat: Lips, mucosa, and tongue normal. Teeth and gums normal 
Neck: supple, symmetrical, trachea midline, no adenopathy, thyroid: not enlarged, symmetric, no tenderness/mass/nodules, no carotid bruit and no JVD Lungs: clear to auscultation bilaterally Heart: regular rate and rhythm, S1, S2 normal, no murmur, click, rub or gallop Abdomen: soft, non-tender. Bowel sounds normal. No masses,  no organomegaly Extremities: extremities normal, atraumatic, no cyanosis or edema Skin: Skin color, texture, turgor normal. No rashes or lesions, left foot is cyanotic and cool. There are no skin lesions. Neurologic: Grossly normal with exception of numbness affecting the left foot as well as foot drop on that side. Vascular Exam: 
  
Right:                                       LEFT:                                                                           
                                                 
       Radial: 2+                                     Radial: 2+ Brachial: 2+                                  Brachial: 2+ Femoral: 2+                                  Femoral: Absent Popliteal: 2+                                 Popliteal: Absent DP: 1+                                          DP: Absent PT: 1+                                          PT: Absent Carotid Bruit: No                          Carotid Bruit: No 
  
Impression:  
    ICD-10-CM ICD-9-CM    
1. Atherosclerosis of native artery of left lower extremity with rest pain ([facility]) I70.222 440.22 CTA ABD ART W RUNOFF W WO CONT  
  
  
Plan: I am going to order a CTA of the abdomen and lower extremities. I suspect that his iliac system on the left is occluded. Given the fact he has had occlusion of recently placed stents on the side he may be better suited with a direct surgical reconstruction. We will look at the images and decide the best option for him for a long-term durable option. I discussed this with the patient and his wife in the room today and they indicated their understanding and are in agreement with our plan of management moving forward. I will plan to call them once I have had opportunity to review the CTA images as they are living in [place] and will try to avoid him having to drive back and forth to often. On review of the CTA, I have recommended an AORTO-BIFEMORAL BYPASS. I have discussed the need for the procedure with the patient and family. I have discussed the procedure with the patient/family in detail today including but not limited to the risk of death, bleeding requiring transfusion, infection, limb loss, or the necessity of subsequent procedures. All questions were answered. They understand and agree to proceed. Shantanu Hernandez MD 
 
Elements of this note have been dictated using speech recognition software.  As a result, errors of speech recognition may have occurred. 
  
  
Approximately 30 minutes  was spent in direct patient contact during this encounter including 50% of which was spend in patient education, counseling, review of medical history and imaging, and medical decision making.  
  
  
Rowena Mortimer, MD

## 2018-10-26 NOTE — ANESTHESIA POSTPROCEDURE EVALUATION
Procedure(s): 
AORTA BI FEMORAL BYPASS. Anesthesia Post Evaluation Multimodal analgesia: multimodal analgesia used between 6 hours prior to anesthesia start to PACU discharge Patient location during evaluation: ICU Patient participation: complete - patient participated Level of consciousness: awake Pain management: adequate Airway patency: patent Anesthetic complications: no 
Cardiovascular status: acceptable and hemodynamically stable Respiratory status: acceptable Hydration status: acceptable Comments: Acceptable for discharge from PACU. Visit Vitals /69 Pulse 82 Temp 36.1 °C (97 °F) Resp (!) 0 Ht 5' 6\" (1.676 m) Wt 69.8 kg (153 lb 12.8 oz) SpO2 92% BMI 24.82 kg/m²

## 2018-10-26 NOTE — ANESTHESIA PROCEDURE NOTES
Arterial Line Placement Start time: 10/26/2018 7:30 AM 
End time: 10/26/2018 7:34 AM 
Performed by: Mecca Armstrong CRNA Authorized by: Mecca Armstrong CRNA Pre-Procedure Indications:  Arterial pressure monitoring and blood sampling Preanesthetic Checklist: patient identified, risks and benefits discussed, anesthesia consent, site marked, patient being monitored, timeout performed and patient being monitored Timeout Time: 07:30 Procedure:  
Prep:  Chlorhexidine Seldinger Technique?: No   
Orientation:  Left Location:  Radial artery Catheter size:  20 G Number of attempts:  1 Cont Cardiac Output Sensor: No   
 
Assessment:  
Post-procedure:  Line secured and sterile dressing applied Patient Tolerance:  Patient tolerated the procedure well with no immediate complications

## 2018-10-26 NOTE — ANESTHESIA PROCEDURE NOTES
Epidural Block Start time: 10/26/2018 6:49 AM 
End time: 10/26/2018 7:04 AM 
Performed by: Cal Silva MD 
Authorized by: Cal Silva MD  
 
Pre-Procedure Indication: labor epidural   
Preanesthetic Checklist: patient identified, risks and benefits discussed, anesthesia consent, site marked, patient being monitored, timeout performed and anesthesia consent Timeout Time: 06:48 Epidural:  
Patient position:  Seated Prep region:  Lumbar Prep: Patient draped and Chlorhexidine Location:  T11-12 Needle and Epidural Catheter:  
Needle Type:  Tuohy Needle Gauge:  19 G Injection Technique:  Loss of resistance using saline Attempts:  2 Catheter Size:  19 G Catheter at Skin Depth (cm):  15 Depth in Epidural Space (cm):  7 Events: no blood with aspiration, no cerebrospinal fluid with aspiration, no paresthesia and negative aspiration test   
Test Dose:  Lidocaine 1.5% w/ epi and negative Assessment:  
Catheter Secured:  Tegaderm and tape Insertion:  Uncomplicated Patient tolerance:  Patient tolerated the procedure well with no immediate complications

## 2018-10-26 NOTE — PROGRESS NOTES
TRANSFER - IN REPORT: 
 
Verbal report received from [facility]) on Myrna Conde  being received from OR(unit) for routine progression of care Report consisted of patients Situation, Background, Assessment and  
Recommendations(SBAR). Information from the following report(s) SBAR, OR Summary, Intake/Output, MAR and Cardiac Rhythm SR was reviewed with the receiving nurse. Opportunity for questions and clarification was provided. Received to [facility] Assessment completed upon patients arrival to unit and care assumed.

## 2018-10-26 NOTE — PERIOP NOTES
Attempted to update family from O.R, friend not available, left message with Alvaro Rod, will try back at 21 [phone]

## 2018-10-26 NOTE — BRIEF OP NOTE
BRIEF OPERATIVE NOTE Date of Procedure: 10/26/2018 Preoperative Diagnosis: Atherosclerosis of native artery of left lower extremity with rest pain ([facility] [address] [I70.222] Postoperative Diagnosis: Atherosclerosis of native artery of left lower extremity with rest pain ([address] [I70.222] Procedure(s): 
AORTA BI FEMORAL BYPASS Surgeon(s) and Role: Fili Gilbert MD - Primary Susana Baumann MD - Assisting Surgical Assistant: Michael Tadeo [facility] Surgical Staff: 
Circ-1: Vivek Kaye RN 
Circ-2: Henri Reeder RN 
Circ-Relief: Ximena Pineda RN Physician Assistant: ERNESTINA Freed Scrub Tech-1: Elissa Simmons Scrub Tech-2: Bryanna Law Scrub Tech-3: Fletcher Erazo Event Time In Time Out Incision Start 2153 Incision Close Anesthesia: General  
Estimated Blood Loss: 250cc Specimens: * No specimens in log * Findings:   
Complications: None Implants:  
Implant Name Type Inv. Item Serial No.  Lot No. LRB No. Used Action GRAFT VASC BIFUR [id number] -- Maridee [place]  GRAFT VASC BIFUR [id number] -- Emerson Lobe [id number] [facility] CARDIOVASCULAR [id number]  1 Implanted

## 2018-10-27 LAB
ANION GAP SERPL CALC-SCNC: 6 MMOL/L (ref 7–16)
ANION GAP SERPL CALC-SCNC: 8 MMOL/L (ref 7–16)
BUN SERPL-MCNC: 14 MG/DL (ref 6–23)
BUN SERPL-MCNC: 17 MG/DL (ref 6–23)
CALCIUM SERPL-MCNC: 7.2 MG/DL (ref 8.3–10.4)
CALCIUM SERPL-MCNC: 7.7 MG/DL (ref 8.3–10.4)
CHLORIDE SERPL-SCNC: 107 MMOL/L (ref 98–107)
CHLORIDE SERPL-SCNC: 109 MMOL/L (ref 98–107)
CO2 SERPL-SCNC: 24 MMOL/L (ref 21–32)
CO2 SERPL-SCNC: 26 MMOL/L (ref 21–32)
CREAT SERPL-MCNC: 0.9 MG/DL (ref 0.8–1.5)
CREAT SERPL-MCNC: 1.13 MG/DL (ref 0.8–1.5)
ERYTHROCYTE [DISTWIDTH] IN BLOOD BY AUTOMATED COUNT: 13.1 %
ERYTHROCYTE [DISTWIDTH] IN BLOOD BY AUTOMATED COUNT: 13.2 %
GLUCOSE SERPL-MCNC: 124 MG/DL (ref 65–100)
GLUCOSE SERPL-MCNC: 129 MG/DL (ref 65–100)
HCT VFR BLD AUTO: 30.2 % (ref 41.1–50.3)
HCT VFR BLD AUTO: 30.5 % (ref 41.1–50.3)
HGB BLD-MCNC: 9.8 G/DL (ref 13.6–17.2)
HGB BLD-MCNC: 9.9 G/DL (ref 13.6–17.2)
MCH RBC QN AUTO: 29.4 PG (ref 26.1–32.9)
MCH RBC QN AUTO: 29.5 PG (ref 26.1–32.9)
MCHC RBC AUTO-ENTMCNC: 32.5 G/DL (ref 31.4–35)
MCHC RBC AUTO-ENTMCNC: 32.5 G/DL (ref 31.4–35)
MCV RBC AUTO: 90.7 FL (ref 79.6–97.8)
MCV RBC AUTO: 90.8 FL (ref 79.6–97.8)
NRBC # BLD: 0 K/UL (ref 0–0.2)
NRBC # BLD: 0 K/UL (ref 0–0.2)
PLATELET # BLD AUTO: 161 K/UL (ref 150–450)
PLATELET # BLD AUTO: 201 K/UL (ref 150–450)
PMV BLD AUTO: 10 FL (ref 9.4–12.3)
PMV BLD AUTO: 9.8 FL (ref 9.4–12.3)
POTASSIUM SERPL-SCNC: 4 MMOL/L (ref 3.5–5.1)
POTASSIUM SERPL-SCNC: 4.4 MMOL/L (ref 3.5–5.1)
RBC # BLD AUTO: 3.33 M/UL (ref 4.23–5.6)
RBC # BLD AUTO: 3.36 M/UL (ref 4.23–5.6)
SODIUM SERPL-SCNC: 139 MMOL/L (ref 136–145)
SODIUM SERPL-SCNC: 141 MMOL/L (ref 136–145)
WBC # BLD AUTO: 11.4 K/UL (ref 4.3–11.1)
WBC # BLD AUTO: 15.3 K/UL (ref 4.3–11.1)

## 2018-10-27 PROCEDURE — 80048 BASIC METABOLIC PNL TOTAL CA: CPT

## 2018-10-27 PROCEDURE — 74011250636 HC RX REV CODE- 250/636: Performed by: SURGERY

## 2018-10-27 PROCEDURE — 65610000006 HC RM INTENSIVE CARE

## 2018-10-27 PROCEDURE — 36600 WITHDRAWAL OF ARTERIAL BLOOD: CPT

## 2018-10-27 PROCEDURE — 74011000258 HC RX REV CODE- 258: Performed by: SURGERY

## 2018-10-27 PROCEDURE — 74011250636 HC RX REV CODE- 250/636: Performed by: ANESTHESIOLOGY

## 2018-10-27 PROCEDURE — 74011250637 HC RX REV CODE- 250/637: Performed by: SURGERY

## 2018-10-27 PROCEDURE — 85027 COMPLETE CBC AUTOMATED: CPT

## 2018-10-27 PROCEDURE — 77030020263 HC SOL INJ SOD CL0.9% LFCR 1000ML

## 2018-10-27 PROCEDURE — 74011000250 HC RX REV CODE- 250: Performed by: SURGERY

## 2018-10-27 RX ORDER — DEXTROSE MONOHYDRATE AND SODIUM CHLORIDE 5; .45 G/100ML; G/100ML
75 INJECTION, SOLUTION INTRAVENOUS CONTINUOUS
Status: DISCONTINUED | OUTPATIENT
Start: 2018-10-27 | End: 2018-10-31 | Stop reason: HOSPADM

## 2018-10-27 RX ORDER — KETOROLAC TROMETHAMINE 15 MG/ML
15 INJECTION, SOLUTION INTRAMUSCULAR; INTRAVENOUS
Status: COMPLETED | OUTPATIENT
Start: 2018-10-27 | End: 2018-10-27

## 2018-10-27 RX ORDER — ACETAMINOPHEN 325 MG/1
650 TABLET ORAL
Status: DISCONTINUED | OUTPATIENT
Start: 2018-10-27 | End: 2018-10-31 | Stop reason: HOSPADM

## 2018-10-27 RX ORDER — HEPARIN SODIUM 5000 [USP'U]/ML
5000 INJECTION, SOLUTION INTRAVENOUS; SUBCUTANEOUS EVERY 8 HOURS
Status: DISCONTINUED | OUTPATIENT
Start: 2018-10-27 | End: 2018-10-31 | Stop reason: HOSPADM

## 2018-10-27 RX ADMIN — Medication 10 ML: at 13:02

## 2018-10-27 RX ADMIN — SODIUM CHLORIDE 1000 MG: 900 INJECTION, SOLUTION INTRAVENOUS at 05:30

## 2018-10-27 RX ADMIN — KETOROLAC TROMETHAMINE 15 MG: 30 INJECTION, SOLUTION INTRAMUSCULAR at 00:21

## 2018-10-27 RX ADMIN — Medication 5 ML: at 06:00

## 2018-10-27 RX ADMIN — METOPROLOL TARTRATE 5 MG: 1 INJECTION, SOLUTION INTRAVENOUS at 12:25

## 2018-10-27 RX ADMIN — HEPARIN SODIUM 5000 UNITS: 5000 INJECTION INTRAVENOUS; SUBCUTANEOUS at 19:31

## 2018-10-27 RX ADMIN — HYDROMORPHONE HYDROCHLORIDE 0.25 MG: 2 INJECTION, SOLUTION INTRAMUSCULAR; INTRAVENOUS; SUBCUTANEOUS at 08:16

## 2018-10-27 RX ADMIN — PHENYLEPHRINE HYDROCHLORIDE 40 MCG/MIN: 10 INJECTION INTRAVENOUS at 01:30

## 2018-10-27 RX ADMIN — METOPROLOL TARTRATE 5 MG: 1 INJECTION, SOLUTION INTRAVENOUS at 23:58

## 2018-10-27 RX ADMIN — Medication 10 ML: at 21:26

## 2018-10-27 RX ADMIN — HYDROMORPHONE HYDROCHLORIDE 0.5 MG: 1 INJECTION, SOLUTION INTRAMUSCULAR; INTRAVENOUS; SUBCUTANEOUS at 17:13

## 2018-10-27 RX ADMIN — HEPARIN SODIUM 5000 UNITS: 5000 INJECTION INTRAVENOUS; SUBCUTANEOUS at 12:25

## 2018-10-27 RX ADMIN — DEXTROSE MONOHYDRATE AND SODIUM CHLORIDE 75 ML/HR: 5; .45 INJECTION, SOLUTION INTRAVENOUS at 12:25

## 2018-10-27 RX ADMIN — HYDROMORPHONE HYDROCHLORIDE 0.5 MG: 1 INJECTION, SOLUTION INTRAMUSCULAR; INTRAVENOUS; SUBCUTANEOUS at 04:41

## 2018-10-27 RX ADMIN — KETOROLAC TROMETHAMINE 15 MG: 15 INJECTION, SOLUTION INTRAMUSCULAR; INTRAVENOUS at 17:31

## 2018-10-27 RX ADMIN — HYDROMORPHONE HYDROCHLORIDE 0.5 MG: 1 INJECTION, SOLUTION INTRAMUSCULAR; INTRAVENOUS; SUBCUTANEOUS at 09:51

## 2018-10-27 RX ADMIN — ACETAMINOPHEN 650 MG: 325 TABLET ORAL at 14:24

## 2018-10-27 RX ADMIN — KETOROLAC TROMETHAMINE 15 MG: 30 INJECTION, SOLUTION INTRAMUSCULAR at 05:33

## 2018-10-27 RX ADMIN — METOPROLOL TARTRATE 5 MG: 1 INJECTION, SOLUTION INTRAVENOUS at 17:19

## 2018-10-27 RX ADMIN — Medication 10 ML: at 08:09

## 2018-10-27 RX ADMIN — HYDROMORPHONE HYDROCHLORIDE 0.5 MG: 1 INJECTION, SOLUTION INTRAMUSCULAR; INTRAVENOUS; SUBCUTANEOUS at 14:30

## 2018-10-27 NOTE — PROGRESS NOTES
[address], [address] [place] [address]. [address] [place] [address] FAX: [phone] VASCULAR SURGERY FLOOR PROGRESS NOTE Admit Date: 10/26/2018 POD: 1 Day Post-Op Procedure:  Procedure(s): 
AORTA BI FEMORAL BYPASS Subjective:  
 
Patient has no new complaints. Objective:  
 
Vitals: 
Blood pressure 129/72, pulse (!) 116, temperature 98.7 °F (37.1 °C), resp. rate 19, height 5' 6\" (1.676 m), weight 153 lb 12.8 oz (69.8 kg), SpO2 92 %. Temp (24hrs), Av.9 °F (36.6 °C), Min:97 °F (36.1 °C), Max:99.1 °F (37.3 °C) Intake / Output: 
 
Intake/Output Summary (Last 24 hours) at 10/27/2018 1027 Last data filed at 10/27/2018 5904 Gross per 24 hour Intake 2553.2 ml Output 1790 ml Net 763.2 ml Physical Exam:   
Constitutional: he appears well-developed. No distress. HENT:  
Head: Atraumatic. Eyes: Pupils are equal, round, and reactive to light. Neck: Normal range of motion. Cardiovascular: Regular rhythm. Pulmonary/Chest: Effort normal and breath sounds normal. No respiratory distress. Abdominal: Soft. Bowel sounds are normal. he exhibits no distension. There is no tenderness. There is no guarding. No hernia. Musculoskeletal: Normal range of motion. Neurological: He is alert. CN II- XII grossly intact Vascular: Postop dressing intact palpable pedal pulses Labs:  
Recent Labs 10/27/18 
0202 10/26/18 
1325 HGB 9.9* 12.6* WBC 15.3* 15.4*  
K 4.4 3.8 * 156* Data Review Assessment:  
 
Patient Active Problem List  
 Diagnosis Date Noted  PAD (peripheral artery disease) ([facility] [address] 10/26/2018 Plan/Recommendations/Medical Decision Making:  
 
Patient hemodynamically stable slightly tachycardic we will continue IV Lopressor to help with the tachycardic no evidence of any bleeding, will recheck CBC at noon 
-We will continue increased dose of the epidural hypotension has been resolved Can have ice chips- 
 -we will plan for keep in ICU overnight tonight and to remove epidural and Humphrey catheter tomorrow morning Elements of this note have been dictated using speech recognition software. As a result, errors of speech recognition may have occurred.

## 2018-10-27 NOTE — PROGRESS NOTES
Visit with patient to build rapport with . Patient is calm. Receptive to  presence. Encouraged and assured patient of our continued care. Jean Carlos Chen,  Staff  C: [phone]  /  [email or web address]

## 2018-10-27 NOTE — PROGRESS NOTES
Bedside and Verbal shift change report received from [address] [place] [address]  by Brittney Alexandre RN. Report with SBAR, Procedure Summary, Intake/Output, MAR and Recent Results.

## 2018-10-27 NOTE — OP NOTES
[facility] REPORT    Mckenna Cash  MR#: [id number]  : 1965  ACCOUNT #: [de-identified]   DATE OF SERVICE: 10/26/2018    PREOPERATIVE DIAGNOSIS:  Bilateral lower extremity ischemia. POSTOPERATIVE DIAGNOSIS:  Bilateral lower extremity ischemia. PROCEDURE PERFORMED:  Aortobifemoral bypass. SURGEON:  Shantanu Hernandez MD    ASSISTANT:  Denisha Osorio PA-C    ANESTHESIA:  General endotracheal plus Epidural.    ESTIMATED BLOOD LOSS:  250 mL. DRAINS:  None. SPECIMENS REMOVED:  None. COMPLICATIONS:  None. IMPLANTS:  See chart. DESCRIPTION OF PROCEDURE:  The patient was brought to operating room and placed on the operating table in supine position. Following adequate general endotracheal anesthesia and establishment of an epidural catheter for postoperative pain management the anterior abdomen was draped and prepped in sterile fashion. A time-out procedure was performed. Vertical incisions were made in the groins. The wounds were deepened using Bovie, sharp blade, the common femoral, profunda and SFAs were identified, mobilized and encircled with vessel loops on each side. Next, subinguinal tunnels were created in the retroperitoneum from both sides to facilitate tunneling of the graft later in the procedure. At this point wet lap sponges were placed in these wounds. Next, a vertical midline incision was made. The wound was deepened and used Bovie to control bleeding. The fascia was entered in the midline and opened throughout the length of the wound. The peritoneal cavity was inspected for abnormalities and there were none seen. Next, the transverse colon was reflected superiorly and the small bowel was eviscerated to the patient's right and protected with warm moist towels. The retroperitoneum overlying the aorta was incised longitudinally. The abdominal aorta was identified up to the level just below the renal arteries.   It was slotted on either side in preparation for cross-clamping. Next, the distal aorta similarly was mobilized in preparation for clamping. The patient was then systemically heparinized. Next, proximal aortic clamp was placed followed by distal aortic clamp. A longitudinal arteriotomy was performed. A small portion of the anterior aortic wall was excised. The 14 x 7 Dacron graft was then spatulated and sewn into position in end-to-side fashion with a running 3-0 Prolene suture. Following completion of the anastomosis a clamp was placed on the graft and the proximal and distal clamps were released. There was minimal bleeding from the anastomotic suture lines. Next, the limbs of the graft were tunneled in the retroperitoneum using previously placed red Robinul catheters. Care was taken not to twist the tunneled graft limbs. Next, on the right an end-to-side anastomosis was created with a running 5-0 Prolene suture. Prior to completion of the anastomosis native vessels were flushed and the graft was flushed as well. Anastomosis was then completed and flow restored. Similarly, on the left and end-to-side anastomosis was created with a running 5-0 Prolene suture. Upon completion of the anastomosis the vessels and graft were flushed. Anastomosis then completed and flow restored. At this point, there was excellent flow distally and palpable pedal pulses. Protamine was administered to reverse heparin effect. The wounds were then inspected for bleeding and none was seen. All wounds were then closed in layers of Vicryl suture. The abdomen was closed with skin staples and the femoral wounds were closed with subcuticular suture. Sterile dry dressings were applied. The patient was awakened from anesthesia and transported to ICU in stable condition. The patient tolerated the procedure well. All needle and sponge counts were correct.       MD Nilda Hodge / [place]  D: 10/26/2018 16:01     T: 10/27/2018 00:54  JOB #: [id number]

## 2018-10-27 NOTE — CDMP QUERY
Please clarify if this patient is being treated/managed for: 
ACUTE BLOOD LOSS ANEMIA in the setting of an aortobifemoral bypass surgery treating with  frequent monitoring of Hgb  
 
 
 
=>Other Explanation of clinical findings =>Unable to Determine (no explanation of clinical findings) The medical record reflects the following: 
 
Risk Factors: s/p aortobifemoral bypass on 10/26 Clinical Indicators: hgb on admission 12.6 post surgery hgb 9.9 on 10/27, EBL 250ml, tachycardic, Treatment: Recheck CBC, serial lab Please clarify and document your clinical opinion in the progress notes and discharge summary including the definitive and/or presumptive diagnosis, (suspected or probable), related to the above clinical findings. Please include clinical findings supporting your diagnosis. Thanks, Radha Hua RN Compliant Documentation Management Program 
[phone]

## 2018-10-27 NOTE — PROGRESS NOTES
Report per SBAR at bedside, epidural pump is beeping air gloria line. Pharmacy called and here at bedside.

## 2018-10-27 NOTE — PROGRESS NOTES
Spoke with Dr. Oumar Quinones updated on Pt condition BP dropped 79/51, NS bolus infusing and Phenylephrine gtt started per order, BP improving. Order's received, Epidural stopped and morning labs to be drawn now, will continue to monitor closely.

## 2018-10-27 NOTE — PROGRESS NOTES
Anesthesiology Epidural Follow-up Post-op day 1. S/p Aorto Bi Fem. Pain is absent with epidural infusion. Blood pressure low over night so infusion stopped. Visit Vitals /75 Pulse (!) 103 Temp 37.3 °C (99.1 °F) Resp 16 Ht 5' 6\" (1.676 m) Comment: Pt stated Wt 69.8 kg (153 lb 12.8 oz) SpO2 95% BMI 24.82 kg/m² Clerance Riroselia No motor weakness. Epidural site ok with dressing intact. Will follow. Will restart at half rate today.

## 2018-10-28 LAB
ANION GAP SERPL CALC-SCNC: 6 MMOL/L (ref 7–16)
BUN SERPL-MCNC: 12 MG/DL (ref 6–23)
CALCIUM SERPL-MCNC: 7.8 MG/DL (ref 8.3–10.4)
CHLORIDE SERPL-SCNC: 106 MMOL/L (ref 98–107)
CO2 SERPL-SCNC: 26 MMOL/L (ref 21–32)
CREAT SERPL-MCNC: 0.88 MG/DL (ref 0.8–1.5)
ERYTHROCYTE [DISTWIDTH] IN BLOOD BY AUTOMATED COUNT: 12.9 %
GLUCOSE SERPL-MCNC: 112 MG/DL (ref 65–100)
HCT VFR BLD AUTO: 28.7 % (ref 41.1–50.3)
HGB BLD-MCNC: 9.3 G/DL (ref 13.6–17.2)
MCH RBC QN AUTO: 29.4 PG (ref 26.1–32.9)
MCHC RBC AUTO-ENTMCNC: 32.4 G/DL (ref 31.4–35)
MCV RBC AUTO: 90.8 FL (ref 79.6–97.8)
NRBC # BLD: 0 K/UL (ref 0–0.2)
PLATELET # BLD AUTO: 140 K/UL (ref 150–450)
PMV BLD AUTO: 9.7 FL (ref 9.4–12.3)
POTASSIUM SERPL-SCNC: 3.9 MMOL/L (ref 3.5–5.1)
RBC # BLD AUTO: 3.16 M/UL (ref 4.23–5.6)
SODIUM SERPL-SCNC: 138 MMOL/L (ref 136–145)
WBC # BLD AUTO: 12.7 K/UL (ref 4.3–11.1)

## 2018-10-28 PROCEDURE — 65660000004 HC RM CVT STEPDOWN

## 2018-10-28 PROCEDURE — 77030020253 HC SOL INJ D545NS .05 DEX .45 SAL

## 2018-10-28 PROCEDURE — 77030032994 HC SOL INJ SOD CL 0.9% LFCR 500ML

## 2018-10-28 PROCEDURE — 36600 WITHDRAWAL OF ARTERIAL BLOOD: CPT

## 2018-10-28 PROCEDURE — 74011000258 HC RX REV CODE- 258: Performed by: SURGERY

## 2018-10-28 PROCEDURE — 74011250637 HC RX REV CODE- 250/637: Performed by: SURGERY

## 2018-10-28 PROCEDURE — 74011250636 HC RX REV CODE- 250/636: Performed by: ANESTHESIOLOGY

## 2018-10-28 PROCEDURE — 85027 COMPLETE CBC AUTOMATED: CPT

## 2018-10-28 PROCEDURE — 74011000250 HC RX REV CODE- 250: Performed by: SURGERY

## 2018-10-28 PROCEDURE — 74011250636 HC RX REV CODE- 250/636: Performed by: SURGERY

## 2018-10-28 PROCEDURE — 97161 PT EVAL LOW COMPLEX 20 MIN: CPT

## 2018-10-28 PROCEDURE — 80048 BASIC METABOLIC PNL TOTAL CA: CPT

## 2018-10-28 RX ORDER — CITALOPRAM 20 MG/1
40 TABLET, FILM COATED ORAL DAILY
Status: DISCONTINUED | OUTPATIENT
Start: 2018-10-28 | End: 2018-10-31 | Stop reason: HOSPADM

## 2018-10-28 RX ORDER — MORPHINE SULFATE 2 MG/ML
2 INJECTION, SOLUTION INTRAMUSCULAR; INTRAVENOUS
Status: DISCONTINUED | OUTPATIENT
Start: 2018-10-28 | End: 2018-10-31 | Stop reason: HOSPADM

## 2018-10-28 RX ORDER — CLONAZEPAM 1 MG/1
1 TABLET ORAL 2 TIMES DAILY
Status: DISCONTINUED | OUTPATIENT
Start: 2018-10-28 | End: 2018-10-31 | Stop reason: HOSPADM

## 2018-10-28 RX ORDER — POTASSIUM CHLORIDE 14.9 MG/ML
20 INJECTION INTRAVENOUS
Status: COMPLETED | OUTPATIENT
Start: 2018-10-28 | End: 2018-10-28

## 2018-10-28 RX ORDER — HYDRALAZINE HYDROCHLORIDE 20 MG/ML
10 INJECTION INTRAMUSCULAR; INTRAVENOUS
Status: DISCONTINUED | OUTPATIENT
Start: 2018-10-28 | End: 2018-10-31 | Stop reason: HOSPADM

## 2018-10-28 RX ADMIN — METOPROLOL TARTRATE 5 MG: 1 INJECTION, SOLUTION INTRAVENOUS at 12:00

## 2018-10-28 RX ADMIN — HYDROMORPHONE HYDROCHLORIDE 0.5 MG: 1 INJECTION, SOLUTION INTRAMUSCULAR; INTRAVENOUS; SUBCUTANEOUS at 00:04

## 2018-10-28 RX ADMIN — Medication 10 ML: at 14:06

## 2018-10-28 RX ADMIN — HYDROMORPHONE HYDROCHLORIDE 0.5 MG: 1 INJECTION, SOLUTION INTRAMUSCULAR; INTRAVENOUS; SUBCUTANEOUS at 19:26

## 2018-10-28 RX ADMIN — POTASSIUM CHLORIDE 20 MEQ: 200 INJECTION, SOLUTION INTRAVENOUS at 12:01

## 2018-10-28 RX ADMIN — Medication 10 ML: at 22:38

## 2018-10-28 RX ADMIN — MORPHINE SULFATE 2 MG: 2 INJECTION, SOLUTION INTRAMUSCULAR; INTRAVENOUS at 14:26

## 2018-10-28 RX ADMIN — ACETAMINOPHEN 650 MG: 325 TABLET ORAL at 09:14

## 2018-10-28 RX ADMIN — METOPROLOL TARTRATE 5 MG: 1 INJECTION, SOLUTION INTRAVENOUS at 06:46

## 2018-10-28 RX ADMIN — MORPHINE SULFATE 2 MG: 2 INJECTION, SOLUTION INTRAMUSCULAR; INTRAVENOUS at 18:39

## 2018-10-28 RX ADMIN — DEXTROSE MONOHYDRATE AND SODIUM CHLORIDE 75 ML/HR: 5; .45 INJECTION, SOLUTION INTRAVENOUS at 19:28

## 2018-10-28 RX ADMIN — CALCIUM GLUCONATE 2 G: 98 INJECTION, SOLUTION INTRAVENOUS at 10:53

## 2018-10-28 RX ADMIN — ONDANSETRON 4 MG: 2 INJECTION INTRAMUSCULAR; INTRAVENOUS at 03:37

## 2018-10-28 RX ADMIN — METOPROLOL TARTRATE 5 MG: 1 INJECTION, SOLUTION INTRAVENOUS at 22:33

## 2018-10-28 RX ADMIN — ONDANSETRON 4 MG: 2 INJECTION INTRAMUSCULAR; INTRAVENOUS at 19:26

## 2018-10-28 RX ADMIN — MORPHINE SULFATE 2 MG: 2 INJECTION, SOLUTION INTRAMUSCULAR; INTRAVENOUS at 16:40

## 2018-10-28 RX ADMIN — POTASSIUM CHLORIDE 20 MEQ: 200 INJECTION, SOLUTION INTRAVENOUS at 14:06

## 2018-10-28 RX ADMIN — HEPARIN SODIUM 5000 UNITS: 5000 INJECTION INTRAVENOUS; SUBCUTANEOUS at 03:18

## 2018-10-28 RX ADMIN — ONDANSETRON 4 MG: 2 INJECTION INTRAMUSCULAR; INTRAVENOUS at 14:26

## 2018-10-28 RX ADMIN — MORPHINE SULFATE 2 MG: 2 INJECTION, SOLUTION INTRAMUSCULAR; INTRAVENOUS at 22:39

## 2018-10-28 RX ADMIN — CITALOPRAM HYDROBROMIDE 40 MG: 20 TABLET ORAL at 18:39

## 2018-10-28 RX ADMIN — METOPROLOL TARTRATE 5 MG: 1 INJECTION, SOLUTION INTRAVENOUS at 15:59

## 2018-10-28 RX ADMIN — HYDROMORPHONE HYDROCHLORIDE 0.5 MG: 1 INJECTION, SOLUTION INTRAMUSCULAR; INTRAVENOUS; SUBCUTANEOUS at 03:31

## 2018-10-28 RX ADMIN — CLONAZEPAM 1 MG: 1 TABLET ORAL at 18:39

## 2018-10-28 RX ADMIN — DEXTROSE MONOHYDRATE AND SODIUM CHLORIDE 75 ML/HR: 5; .45 INJECTION, SOLUTION INTRAVENOUS at 00:15

## 2018-10-28 RX ADMIN — Medication 10 ML: at 06:46

## 2018-10-28 RX ADMIN — HYDROMORPHONE HYDROCHLORIDE 0.5 MG: 1 INJECTION, SOLUTION INTRAMUSCULAR; INTRAVENOUS; SUBCUTANEOUS at 09:17

## 2018-10-28 RX ADMIN — ONDANSETRON 4 MG: 2 INJECTION INTRAMUSCULAR; INTRAVENOUS at 09:23

## 2018-10-28 RX ADMIN — HYDRALAZINE HYDROCHLORIDE 10 MG: 20 INJECTION INTRAMUSCULAR; INTRAVENOUS at 16:50

## 2018-10-28 RX ADMIN — SODIUM CHLORIDE 500 ML: 900 INJECTION, SOLUTION INTRAVENOUS at 10:40

## 2018-10-28 RX ADMIN — HEPARIN SODIUM 5000 UNITS: 5000 INJECTION INTRAVENOUS; SUBCUTANEOUS at 11:59

## 2018-10-28 RX ADMIN — MORPHINE SULFATE 2 MG: 2 INJECTION, SOLUTION INTRAMUSCULAR; INTRAVENOUS at 10:52

## 2018-10-28 RX ADMIN — ONDANSETRON 4 MG: 2 INJECTION INTRAMUSCULAR; INTRAVENOUS at 22:39

## 2018-10-28 NOTE — PROGRESS NOTES
Patient seen and examined. Postop day 2 from aortobifem. Patient clinically stable. Surgical sites dressings intact palpable pedal pulses. We will plan to remove epidural and DC Humphrey catheter today. Patient with positive bowel sounds will just start with sips of clears. We will continue IV fluids as needed pain control. CBC been stable we will replace her calcium and potassium electrolytes. If stable midday will transfer to stepdown unit. Kay Sargent

## 2018-10-28 NOTE — PROGRESS NOTES
Bedside report given to [address] oncoming nurse. Opportunity for questions, concerns. Patient involved.

## 2018-10-28 NOTE — PROGRESS NOTES
TRANSFER - IN REPORT: 
 
Verbal report received from Ynes RN (name) on Lisandro Jaime  being received from CV ICU(unit) for routine progression of care Report consisted of patients Situation, Background, Assessment and  
Recommendations(SBAR). Information from the following report(s) SBAR, Procedure Summary, Intake/Output, MAR and Cardiac Rhythm ST was reviewed with the receiving nurse. Opportunity for questions and clarification was provided. Assessment completed upon patients arrival to unit and care assumed. Dual skin assessment completed by RN no skin breakdown noted to sacrum. Abd. and bilat groin dressings C/D/I.

## 2018-10-28 NOTE — PROGRESS NOTES
Surgeon requests epidural removal.   
 
POD # 2; s/p aortobifem 10/26/18. Pt has not received SQ heparin yet today. Epidural removed. Tip intact. Site clear and free from signs of infection.

## 2018-10-28 NOTE — PROGRESS NOTES
TRANSFER - OUT REPORT: 
 
Verbal report given to [facility], RN (name) on Etienne Babcock  being transferred to [facility] (unit) for routine progression of care Report consisted of patients Situation, Background, Assessment and  
Recommendations(SBAR). Information from the following report(s) SBAR, Intake/Output, MAR and Cardiac Rhythm ST was reviewed with the receiving nurse. Lines:  
Peripheral IV 10/26/18 Right Hand (Active) Site Assessment Clean, dry, & intact 10/28/2018 12:09 PM  
Phlebitis Assessment 0 10/28/2018 12:09 PM  
Infiltration Assessment 0 10/28/2018 12:09 PM  
Dressing Status Clean, dry, & intact 10/28/2018 12:09 PM  
Dressing Type Transparent 10/28/2018 12:09 PM  
Hub Color/Line Status Infusing 10/28/2018 12:09 PM  
Action Taken Open ports on tubing capped 10/27/2018  3:00 AM  
   
Peripheral IV 10/26/18 Right Wrist (Active) Site Assessment Clean, dry, & intact 10/28/2018 12:09 PM  
Phlebitis Assessment 0 10/28/2018 12:09 PM  
Infiltration Assessment 0 10/28/2018 12:09 PM  
Dressing Status Clean, dry, & intact 10/28/2018 12:09 PM  
Dressing Type Transparent 10/28/2018 12:09 PM  
Hub Color/Line Status Capped 10/28/2018 12:09 PM  
Action Taken Open ports on tubing capped 10/27/2018  7:28 AM  
  
 
Opportunity for questions and clarification was provided. Patient transported with: 
 Registered Nurse

## 2018-10-28 NOTE — PROGRESS NOTES
Bedside shift change report given to Abad Villafuerte RN (oncoming nurse) by Heather Harding RN (offgoing nurse). Report included the following information SBAR, Kardex, OR Summary, Intake/Output, MAR, Recent Results, Med Rec Status and Cardiac Rhythm of Sinus Tachycardia.

## 2018-10-28 NOTE — PROGRESS NOTES
Dr. Felicia Pompa paged to update to current /87 . Pt still diaphoretic. Reviewed withdrawals symptoms from Celexa and Klonopin with MD. He ordered to start back home meds. Celexa 40 mg and Klonopin 1 mg BID order first dose for now.

## 2018-10-28 NOTE — PROGRESS NOTES
Dr Valente Leos at bedside and updated. Orders received. Dr Valente Leos spoke with pt and wife. Pt stated that he was passing flatus and was ready to get out of bed.

## 2018-10-28 NOTE — PROGRESS NOTES
Problem: Mobility Impaired (Adult and Pediatric) Goal: *Acute Goals and Plan of Care (Insert Text) LTG: 
(1.)Mr. Mason will move from supine to sit and sit to supine , scoot up and down and roll side to side with INDEPENDENT within 7 treatment day(s) through log rolling from flat surface without handrail. (2.)Mr. Mason will transfer from bed to chair and chair to bed with MODIFIED INDEPENDENCE using the least restrictive device within 7 treatment day(s). (3.)Mr. Mason will ambulate with MODIFIED INDEPENDENCE for 500+ feet with the least restrictive device within 7 treatment day(s) while maintaining normal vital signs. (4.)Mr. Mason will be independent with HEP for B LE mobility and strengthening within 7 treatment days. _________________________________________________________________________________________ PHYSICAL THERAPY: Initial Assessment, PM 10/28/2018INPATIENT: Hospital Day: 3 Payor: Day / Plan: [address] [place] [address] Avenue / Product Type: Looking for [employer] Medicaid /  
  
NAME/AGE/GENDER: Reina Shafer is a 46 y.o. 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 
AORTA BI FEMORAL BYPASS (Bilateral) 2 Days Post-Op ICD-10: Treatment Diagnosis:  
 · Generalized Muscle Weakness (M62.81) · Difficulty in walking, Not elsewhere classified (R26.2) Precaution/Allergies: 
Patient has no known allergies. ASSESSMENT:  
Mr. Saurabh Garcia presents with decreased bed mobility, transfers, ambulation, balance, activity tolerance, and overall general functional mobility s/p hospital admission with aorta bi femoral bypass surgery on 10/26/18. Pt A & Ox 4, on room air, seen in CVICU. Pt with history of R CVA, L blanche ~ 1 1/2 years ago per pt.  Pt relates wears AFO L LE, ambulates with cane, independent in ADLs, drives. Pt lives with spouse and 23 y.o son in home, has ramp to enter. Pt reports no falls at home, MMT in sitting, R LE WDL, L LE limited hip flex due to abdominal pain, knee ext 4/5, DF 1/5. Pt able to wiggle L toes, pt is R handed. Pt today complaints of pain 6/10, has been medicated per RN. Pt only agreeable for OOB to chair only. Limits self due to pain. Pt required MIN A for transfers, and HHA x 2 with close contact for steps bed to chair. Pt without AFO this date, reports will let spouse know to bring in AFO and shoes pt prefers. Pt overall doing fairly well, limited by pain; don't anticipate any needs at discharge at this time. PT to cont to follow for acute care needs to address deficits noted above. This section established at most recent assessment PROBLEM LIST (Impairments causing functional limitations): 1. Decreased ADL/Functional Activities 2. Decreased Transfer Abilities 3. Decreased Ambulation Ability/Technique 4. Decreased Balance 5. Increased Pain 6. Decreased Activity Tolerance 7. Decreased [place] with Home Exercise Program 
 INTERVENTIONS PLANNED: (Benefits and precautions of physical therapy have been discussed with the patient.) 1. Balance Exercise 2. Bed Mobility 3. Family Education 4. Gait Training 5. Home Exercise Program (HEP) 6. Therapeutic Activites 7. Therapeutic Exercise/Strengthening 8. Transfer Training TREATMENT PLAN: Frequency/Duration: daily for duration of hospital stay Rehabilitation Potential For Stated Goals: Good RECOMMENDED REHABILITATION/EQUIPMENT: (at time of discharge pending progress): Due to the probability of continued deficits (see above) this patient will not likely need continued skilled physical therapy after discharge. Equipment:  
? None at this time HISTORY:  
History of Present Injury/Illness (Reason for Referral): 
48-year-old male that presents for evaluation of an ischemic left leg.  He is also history of cigarette smoking and according to him he had a crush injury on the job where he feels like he occluded the femoral artery on the left.  The details of his treatment for their own became fairly unclear however does sound like he was treated [facility] and had some stents placed in the left iliac system.  He developed new discomfort and coolness as well as numbness in the left leg and foot mostly below the knee and came back for further evaluation. Monica Liu had an ultrasound done this morning that demonstrated the left iliac system to be occluded.  He complains primarily of numbness and also has pain in his left foot as well at rest. 
  
He also had what sounds like a right hemispheric stroke that left him with left hemiparesis with some residual symptoms on the left side and the left hand where he has a mild degree of clumsiness and he also has foot drop on the left.  I believe that the foot drop was a result of the stroke rather than chronic ischemia.  However despite that he is able to walk with the assistance of an AFO.  He really continues to work.  Unfortunately continues to smoke but he is cut down to what he describes about 4 cigarettes/day. Monica Liu denies any cardiopulmonary disease or previous coronary issues.  He lives in [place] he is accompanied today by his wife Past Medical History/Comorbidities:  
Mr. Nilson Mason  has a past medical history of Anxiety disorder, Chronic pain, CVA (cerebral vascular accident) ([address] Depression, Hernia, inguinal, Hypercholesterolemia, Hypertension, and Trauma. Mr. Nilson Mason  has a past surgical history that includes vascular surgery procedure unlist (Left, last one placed 17 yrs ago). Social History/Living Environment:  
Home Environment: Private residence # Steps to Enter: 0 Wheelchair Ramp: Yes One/Two Story Residence: One story Living Alone: No 
Support Systems: Family member(s) Patient Expects to be Discharged to[de-identified] Private residence Current DME Used/Available at Home: Cane, straight, Grab bars(AFO L LE) Tub or Shower Type: Tub/Shower combination Prior Level of Function/Work/Activity: 
Lives with spouse; amb with cane, wears AFO L LE; indep ADLs, drives, does not work Personal Factors:   
      Sex:  male Age:  46 y.o. Overall Behavior:  Agreeable, somewhat impatient Number of Personal Factors/Comorbidities that affect the Plan of Care: CVA 1-2: MODERATE COMPLEXITY EXAMINATION:  
Most Recent Physical Functioning:  
Gross Assessment: 
AROM: Generally decreased, functional(L > R, foot drop L, able to wiggle toes, lacks full range) Strength: Generally decreased, functional(R LE WDL; L LE limited by pain hip flex, Knee ex 4/5, DF 1/5) Coordination: Generally decreased, functional 
Sensation: Intact(to light touch per pt B LE) Posture: 
Posture (WDL): Within defined limits Balance: 
Sitting: Impaired Sitting - Static: Good (unsupported) Sitting - Dynamic: Fair (occasional) Standing: Impaired Standing - Static: Fair Standing - Dynamic : Fair Bed Mobility: 
Supine to Sit: (stand from bed in chair position) Scooting: Stand-by assistance Wheelchair Mobility: 
  
Transfers: 
Sit to Stand: Minimum assistance Stand to Sit: Minimum assistance Bed to Chair: Minimum assistance Gait: 
  
Base of Support: Narrowed Speed/Valarie: Pace decreased (<100 feet/min); Shuffled Step Length: Left shortened;Right shortened Swing Pattern: Left asymmetrical 
Gait Abnormalities: Antalgic;Decreased step clearance;Shuffling gait; Steppage gait Distance (ft): 3 Feet (ft)(bed to chair, refused to amb further this date) Assistive Device: (HHA x 2, close contact) Ambulation - Level of Assistance: Minimal assistance Interventions: Safety awareness training;Verbal cues Body Structures Involved: 1. Muscles Body Functions Affected: 1. Movement Related Activities and Participation Affected: 1. General Tasks and Demands 2. Mobility 3. Self Care Number of elements that affect the Plan of Care: 4+: HIGH COMPLEXITY CLINICAL PRESENTATION:  
Presentation: Evolving clinical presentation with changing clinical characteristics: MODERATE COMPLEXITY CLINICAL DECISION MAKIN [facility] [address] [address] AM-PAC 6 Clicks Basic Mobility Inpatient Short Form How much difficulty does the patient currently have. .. Unable A Lot A Little None 1. Turning over in bed (including adjusting bedclothes, sheets and blankets)? [] 1   [] 2   [x] 3   [] 4  
2. Sitting down on and standing up from a chair with arms ( e.g., wheelchair, bedside commode, etc.)   [] 1   [] 2   [x] 3   [] 4  
3. Moving from lying on back to sitting on the side of the bed? [] 1   [] 2   [x] 3   [] 4 How much help from another person does the patient currently need. .. Total A Lot A Little None 4. Moving to and from a bed to a chair (including a wheelchair)? [] 1   [] 2   [x] 3   [] 4  
5. Need to walk in hospital room? [] 1   [] 2   [x] 3   [] 4  
6. Climbing 3-5 steps with a railing? [] 1   [] 2   [x] 3   [] 4  
© , Trustees of [address] [facility] [address] [address], under license to [employer]. All rights reserved Score:  Initial: 18 Most Recent: X (Date: -- ) Interpretation of Tool:  Represents activities that are increasingly more difficult (i.e. Bed mobility, Transfers, Gait). Score 24 23 22-20 19-15 14-10 9-7 6 Modifier CH CI CJ CK CL CM CN   
 
? Mobility - Walking and Moving Around:  
  - CURRENT STATUS: CK - 40%-59% impaired, limited or restricted  - GOAL STATUS: CJ - 20%-39% impaired, limited or restricted  - D/C STATUS:  ---------------To be determined--------------- Payor: Day / Plan: [address] [place] [address] Avenue / Product Type: Managed Care Medicaid /   
 
Medical Necessity:    
· Patient is expected to demonstrate progress in strength, range of motion, balance and coordination to decrease assistance required with overall functional mobility, transfers, ambulation. · Patient demonstrates good rehab potential due to higher previous functional level. Reason for Services/Other Comments: 
· Patient continues to require present interventions due to patient's inability to perform bed mobility, transfers, ambulation safely and effectively. Use of outcome tool(s) and clinical judgement create a POC that gives a: Clear prediction of patient's progress: LOW COMPLEXITY  
  
 
 
 
TREATMENT:  
(In addition to Assessment/Re-Assessment sessions the following treatments were rendered) Pre-treatment Symptoms/Complaints:  \"I am ready to get out of here\" Pain: Initial:  
Pain Intensity 1: 6(has had medication per RN) Pain Location 1: Abdomen Pain Intervention(s) 1: Emotional support, Exercise, Repositioned  Post Session:  6/10 post session in chair Assessment/Reassessment only, no treatment provided today Braces/Orthotics/Lines/Etc:  
· IV 
· gomez catheter · CVICU lines and monitors · O2 Device: Room air Treatment/Session Assessment:   
· Response to Treatment:  Fair tolerance, limited by pain · Interdisciplinary Collaboration:  
o Physical Therapist 
o Registered Nurse · After treatment position/precautions:  
o Up in chair 
o Bed/Chair-wheels locked 
o Bed in low position 
o Call light within reach 
o Nurse at bedside · Compliance with Program/Exercises: Will assess as treatment progresses · Recommendations/Intent for next treatment session: \"Next visit will focus on advancements to more challenging activities\". Total Treatment Duration: PT Patient Time In/Time Out Time In: 7225 Time Out: 1330 Manav Koenig PT

## 2018-10-28 NOTE — PROGRESS NOTES
Pt voided on floor felt like he needed to have BM only passed gas. Assisted back to bed. R hand PIV dressing loose. Removed dressing and cleaned and Tegaderm applied.

## 2018-10-28 NOTE — PROGRESS NOTES
Dr. Rosalinda rueda made aware of /104 -110 ST. Pt also is agitated and very diaphoretic. Pt daily meds includes Klonopin 1mg BID, Baclofen, Lisinopril 2.5 mg, Lopressor 25 mg. Request made to start Klonopin MD declined. Requested Ativan to decrease pt agitation. MD declined order Dilaudid I reported this has been given and not effective on patient. New orders received for Hydralzine 10 mg IVP for SBP > 160 and give Scheduled Lopressor now.

## 2018-10-29 PROBLEM — I74.09 AORTOILIAC OCCLUSIVE DISEASE (HCC): Status: ACTIVE | Noted: 2018-10-29

## 2018-10-29 PROBLEM — F17.209 TOBACCO USE DISORDER, CONTINUOUS: Status: ACTIVE | Noted: 2018-10-29

## 2018-10-29 PROCEDURE — 74011000258 HC RX REV CODE- 258: Performed by: SURGERY

## 2018-10-29 PROCEDURE — 65660000004 HC RM CVT STEPDOWN

## 2018-10-29 PROCEDURE — 74011250636 HC RX REV CODE- 250/636: Performed by: SURGERY

## 2018-10-29 PROCEDURE — 74011250637 HC RX REV CODE- 250/637: Performed by: SURGERY

## 2018-10-29 PROCEDURE — 74011250637 HC RX REV CODE- 250/637: Performed by: PHYSICIAN ASSISTANT

## 2018-10-29 PROCEDURE — 74011000250 HC RX REV CODE- 250: Performed by: SURGERY

## 2018-10-29 PROCEDURE — 97530 THERAPEUTIC ACTIVITIES: CPT

## 2018-10-29 PROCEDURE — 74011250636 HC RX REV CODE- 250/636: Performed by: ANESTHESIOLOGY

## 2018-10-29 PROCEDURE — 97110 THERAPEUTIC EXERCISES: CPT

## 2018-10-29 RX ORDER — DIPHENHYDRAMINE HCL 25 MG
50 CAPSULE ORAL
Status: DISCONTINUED | OUTPATIENT
Start: 2018-10-29 | End: 2018-10-31 | Stop reason: HOSPADM

## 2018-10-29 RX ORDER — METOPROLOL TARTRATE 25 MG/1
25 TABLET, FILM COATED ORAL EVERY 12 HOURS
Status: DISCONTINUED | OUTPATIENT
Start: 2018-10-29 | End: 2018-10-31 | Stop reason: HOSPADM

## 2018-10-29 RX ADMIN — Medication 10 ML: at 17:34

## 2018-10-29 RX ADMIN — CLONAZEPAM 1 MG: 1 TABLET ORAL at 08:04

## 2018-10-29 RX ADMIN — MORPHINE SULFATE 2 MG: 2 INJECTION, SOLUTION INTRAMUSCULAR; INTRAVENOUS at 10:55

## 2018-10-29 RX ADMIN — Medication 10 ML: at 06:00

## 2018-10-29 RX ADMIN — CITALOPRAM HYDROBROMIDE 40 MG: 20 TABLET ORAL at 08:04

## 2018-10-29 RX ADMIN — METOPROLOL TARTRATE 5 MG: 1 INJECTION, SOLUTION INTRAVENOUS at 05:35

## 2018-10-29 RX ADMIN — ONDANSETRON 4 MG: 2 INJECTION INTRAMUSCULAR; INTRAVENOUS at 10:55

## 2018-10-29 RX ADMIN — HYDROMORPHONE HYDROCHLORIDE 0.5 MG: 1 INJECTION, SOLUTION INTRAMUSCULAR; INTRAVENOUS; SUBCUTANEOUS at 07:00

## 2018-10-29 RX ADMIN — HEPARIN SODIUM 5000 UNITS: 5000 INJECTION INTRAVENOUS; SUBCUTANEOUS at 21:00

## 2018-10-29 RX ADMIN — HYDROMORPHONE HYDROCHLORIDE 0.25 MG: 2 INJECTION, SOLUTION INTRAMUSCULAR; INTRAVENOUS; SUBCUTANEOUS at 20:33

## 2018-10-29 RX ADMIN — DEXTROSE MONOHYDRATE AND SODIUM CHLORIDE 75 ML/HR: 5; .45 INJECTION, SOLUTION INTRAVENOUS at 17:34

## 2018-10-29 RX ADMIN — METOPROLOL TARTRATE 25 MG: 25 TABLET ORAL at 10:55

## 2018-10-29 RX ADMIN — DEXTROSE MONOHYDRATE AND SODIUM CHLORIDE 75 ML/HR: 5; .45 INJECTION, SOLUTION INTRAVENOUS at 02:21

## 2018-10-29 RX ADMIN — CLONAZEPAM 1 MG: 1 TABLET ORAL at 17:32

## 2018-10-29 RX ADMIN — HYDROMORPHONE HYDROCHLORIDE 0.5 MG: 1 INJECTION, SOLUTION INTRAMUSCULAR; INTRAVENOUS; SUBCUTANEOUS at 14:36

## 2018-10-29 RX ADMIN — ACETAMINOPHEN 650 MG: 325 TABLET ORAL at 08:04

## 2018-10-29 RX ADMIN — Medication 10 ML: at 20:19

## 2018-10-29 RX ADMIN — ONDANSETRON 4 MG: 2 INJECTION INTRAMUSCULAR; INTRAVENOUS at 17:41

## 2018-10-29 RX ADMIN — HYDRALAZINE HYDROCHLORIDE 10 MG: 20 INJECTION INTRAMUSCULAR; INTRAVENOUS at 12:17

## 2018-10-29 RX ADMIN — Medication 10 ML: at 05:36

## 2018-10-29 RX ADMIN — HYDROMORPHONE HYDROCHLORIDE 0.5 MG: 1 INJECTION, SOLUTION INTRAMUSCULAR; INTRAVENOUS; SUBCUTANEOUS at 02:21

## 2018-10-29 RX ADMIN — MORPHINE SULFATE 2 MG: 2 INJECTION, SOLUTION INTRAMUSCULAR; INTRAVENOUS at 17:42

## 2018-10-29 RX ADMIN — HEPARIN SODIUM 5000 UNITS: 5000 INJECTION INTRAVENOUS; SUBCUTANEOUS at 02:22

## 2018-10-29 RX ADMIN — HYDROMORPHONE HYDROCHLORIDE 0.5 MG: 1 INJECTION, SOLUTION INTRAMUSCULAR; INTRAVENOUS; SUBCUTANEOUS at 08:19

## 2018-10-29 RX ADMIN — Medication 10 ML: at 20:10

## 2018-10-29 RX ADMIN — HEPARIN SODIUM 5000 UNITS: 5000 INJECTION INTRAVENOUS; SUBCUTANEOUS at 10:55

## 2018-10-29 RX ADMIN — DIPHENHYDRAMINE HYDROCHLORIDE 50 MG: 25 CAPSULE ORAL at 20:37

## 2018-10-29 RX ADMIN — METOPROLOL TARTRATE 25 MG: 25 TABLET ORAL at 20:20

## 2018-10-29 RX ADMIN — HYDROMORPHONE HYDROCHLORIDE 0.5 MG: 1 INJECTION, SOLUTION INTRAMUSCULAR; INTRAVENOUS; SUBCUTANEOUS at 23:33

## 2018-10-29 NOTE — PROGRESS NOTES
Progress Note Patient: Myrna Conde MRN: [id number]  SSN: [id number] YOB: 1965  Age: 46 y.o. Sex: male Admission Date: 10/26/2018 LOS: 3 days 3 Days Post-Op PROCEDURE(S): 
Aortobifemoral bypass Subjective:  
 
Patient reports abdominal pain, responds adequately to meds. Tachycardic, elevated BP. Limited OOB / ambulation so far, will be working with PT today, AFO and shoes in room. Sips / ice chips, passing flatus. Objective:  
 
Vitals:  
 10/28/18 2234 10/28/18 2243 10/29/18 0226 10/29/18 0730 BP: 174/87 142/81 166/86 (!) 195/97 Pulse: (!) 108 93 (!) 107 Resp:  24 18 20 Temp:  99.1 °F (37.3 °C) 98.8 °F (37.1 °C) 97.9 °F (36.6 °C) SpO2: 96% 94%  95% Weight:   157 lb 9.6 oz (71.5 kg) Height:      
  
 
Intake and Output: 
Current Shift: No intake/output data recorded. Last three shifts: 10/27 1901 - 10/29 0700 In: 7988 [P.O.:250; I.V.:2037.5] Out: 2200 [Urine:2200] Physical Exam:  
GENERAL: alert, cooperative, no distress EYE: EOM intact, no nystagmus LUNG: clear to auscultation bilaterally, normal respiratory effort HEART: tachycardic, regular underlying rhythm ABDOMEN: soft, globally tender, nondistended, bowel sounds infrequent and hollow; midline incision intact with staples, no induration / erythema / drainage EXTREMITIES: warm, moves all four; groin incisions intact with tissue adhesive, moderate ecchymosis, mild induration, no drainage PULSES: radial, brachial and dorsalis pedis palpable 2+ and symmetric bilaterally Lab/Data Review: All lab results for the last 48 hours reviewed. Assessment / Plan:  
 
Principal Problem: Aortoiliac occlusive disease ([facility] (10/29/2018) Active Problems: 
  PAD (peripheral artery disease) ([facility] (10/26/2018) Tobacco use disorder, continuous (10/29/2018) Resume home PO metoprolol Redress incisions Ambulate, OOB to chair - appreciate PT Remains on sips / chips for now Signed By: Lorenza Jaime PA-C October 29, 2018 Physician Assistant with [facility] Vascular Surgery Emily Tafoya.  Kavya Bello MD / Erica Rubin MD

## 2018-10-29 NOTE — PROGRESS NOTES
Bedside report given to Radha Quiros, RN and Maranda Mcneill, RN on patient Sagrario Wright. VSS, patient resting comfortably in bed, questions answered, continuing to monitor in CVSD

## 2018-10-29 NOTE — PROGRESS NOTES
Care Management Interventions PCP Verified by CM: Yes 
Palliative Care Criteria Met (RRAT>21 & CHF Dx)?: No(RRAT 7 Dx S/P Femoral bypass) Transition of Care Consult (CM Consult): Discharge Planning Discharge Durable Medical Equipment: No 
Physical Therapy Consult: Yes Occupational Therapy Consult: No 
Speech Therapy Consult: No 
Current Support Network: Other(lives with hiram Armando) Confirm Follow Up Transport: Other (see comment)(Hiram Armando) Plan discussed with Pt/Family/Caregiver: Yes Freedom of Choice Offered: Yes Discharge Location Discharge Placement: Unable to determine at this time Met with patient for d/c planning. Patient alert and oriented x 3, lives with his Sanjay Ta who assists him with ADL's at home. He states had CVA in March 2017 and has required assistance with ADL's since that time. He has a cane as he cannot use his left hand well since stroke to use walker and has AFO. He is S/P Aortobifemoral Bypass. He has [employer] [address] [employer] Medicaid for insurance and obtains his medications at [address] [place] [address] [facility]. Saw Praveen DO a week ago. He plans to d/c home with his hiram when medically stable. Noted PT consult CM will follow progress.

## 2018-10-29 NOTE — PROGRESS NOTES
Problem: Mobility Impaired (Adult and Pediatric) Goal: *Acute Goals and Plan of Care (Insert Text) LTG: 
(1.)Mr. Mason will move from supine to sit and sit to supine , scoot up and down and roll side to side with INDEPENDENT within 7 treatment day(s) through log rolling from flat surface without handrail. (2.)Mr. Mason will transfer from bed to chair and chair to bed with MODIFIED INDEPENDENCE using the least restrictive device within 7 treatment day(s). (3.)Mr. Mason will ambulate with MODIFIED INDEPENDENCE for 500+ feet with the least restrictive device within 7 treatment day(s) while maintaining normal vital signs. (4.)Mr. Mason will be independent with HEP for B LE mobility and strengthening within 7 treatment days. _________________________________________________________________________________________ PHYSICAL THERAPY: Daily Note, Treatment Day: 1st, AM 10/29/2018INPATIENT: Hospital Day: 4 Payor: Day / Plan: [address] [place] [address] Avenue / Product Type: Managed Care Medicaid /  
  
NAME/AGE/GENDER: Pratik Howe is a 46 y.o. male PRIMARY DIAGNOSIS: Atherosclerosis of native artery of left lower extremity with rest pain (HCC) [I70.222] Aortoiliac occlusive disease ([address] Aortoiliac occlusive disease ([address] Procedure(s) (LRB): 
AORTA BI FEMORAL BYPASS (Bilateral) 3 Days Post-Op ICD-10: Treatment Diagnosis:  
 · Generalized Muscle Weakness (M62.81) · Difficulty in walking, Not elsewhere classified (R26.2) Precaution/Allergies: 
Patient has no known allergies. ASSESSMENT:  
Mr. Klaus Patel presents lying supine in bed. He is agreeable to treatment. He needed SBA for bed mobility to get to edge of the bed. He stood with CGA and then ambulated around the bed with CGA and left AFO to chair. He needed max assist to don left AFO. He was then instructed in seated exercises while up in chair.  He then ambulated into bathroom with CGA and no assistive device. He is independent with toilet hygiene. He is progressing toward all goals. Will continue PT efforts. This section established at most recent assessment PROBLEM LIST (Impairments causing functional limitations): 1. Decreased ADL/Functional Activities 2. Decreased Transfer Abilities 3. Decreased Ambulation Ability/Technique 4. Decreased Balance 5. Increased Pain 6. Decreased Activity Tolerance 7. Decreased [place] with Home Exercise Program 
 INTERVENTIONS PLANNED: (Benefits and precautions of physical therapy have been discussed with the patient.) 1. Balance Exercise 2. Bed Mobility 3. Family Education 4. Gait Training 5. Home Exercise Program (HEP) 6. Therapeutic Activites 7. Therapeutic Exercise/Strengthening 8. Transfer Training TREATMENT PLAN: Frequency/Duration: daily for duration of hospital stay Rehabilitation Potential For Stated Goals: Good RECOMMENDED REHABILITATION/EQUIPMENT: (at time of discharge pending progress): Due to the probability of continued deficits (see above) this patient will not likely need continued skilled physical therapy after discharge. Equipment:  
? None at this time HISTORY:  
History of Present Injury/Illness (Reason for Referral): 
58-year-old male that presents for evaluation of an ischemic left leg.  He is also history of cigarette smoking and according to him he had a crush injury on the job where he feels like he occluded the femoral artery on the left.  The details of his treatment for their own became fairly unclear however does sound like he was treated [facility] and had some stents placed in the left iliac system.  He developed new discomfort and coolness as well as numbness in the left leg and foot mostly below the knee and came back for further evaluation. Ari Montes De Oca had an ultrasound done this morning that demonstrated the left iliac system to be occluded.  He complains primarily of numbness and also has pain in his left foot as well at rest. 
  
He also had what sounds like a right hemispheric stroke that left him with left hemiparesis with some residual symptoms on the left side and the left hand where he has a mild degree of clumsiness and he also has foot drop on the left.  I believe that the foot drop was a result of the stroke rather than chronic ischemia.  However despite that he is able to walk with the assistance of an AFO.  He really continues to work.  Unfortunately continues to smoke but he is cut down to what he describes about 4 cigarettes/day. Douglas Shells denies any cardiopulmonary disease or previous coronary issues.  He lives in [place] he is accompanied today by his wife Past Medical History/Comorbidities:  
Mr. Saurabh Garcia  has a past medical history of Anxiety disorder, Chronic pain, CVA (cerebral vascular accident) ([address] Depression, Hernia, inguinal, Hypercholesterolemia, Hypertension, and Trauma. Mr. Saurabh Garcia  has a past surgical history that includes vascular surgery procedure unlist (Left, last one placed 17 yrs ago) and AORTA BI FEMORAL BYPASS (Bilateral, 10/26/2018). Social History/Living Environment:  
Home Environment: Private residence # Steps to Enter: 0 Wheelchair Ramp: Yes One/Two Story Residence: One story Living Alone: No 
Support Systems: Family member(s) Patient Expects to be Discharged to[de-identified] Private residence Current DME Used/Available at Home: Cane, straight, Grab bars(AFO L LE) Tub or Shower Type: Tub/Shower combination Prior Level of Function/Work/Activity: 
Lives with spouse; amb with cane, wears AFO L LE; indep ADLs, drives, does not work Personal Factors:   
      Sex:  male Age:  46 y.o. Overall Behavior:  Agreeable, somewhat impatient Number of Personal Factors/Comorbidities that affect the Plan of Care: CVA 1-2: MODERATE COMPLEXITY EXAMINATION:  
Most Recent Physical Functioning: Gross Assessment: 
  
         
  
Posture: 
Posture (WDL): Within defined limits Balance: 
Sitting: Impaired Sitting - Static: Good (unsupported) Sitting - Dynamic: Fair (occasional) Standing: Impaired Standing - Static: Fair Standing - Dynamic : Fair Bed Mobility: 
Sit to Supine: Stand-by assistance Scooting: Stand-by assistance Wheelchair Mobility: 
  
Transfers: 
Sit to Stand: Contact guard assistance Stand to Sit: Contact guard assistance Bed to Chair: Contact guard assistance Gait: 
  
Base of Support: Narrowed Speed/Valarie: Pace decreased (<100 feet/min) Step Length: Left shortened;Right shortened Swing Pattern: Left asymmetrical 
Gait Abnormalities: Antalgic;Decreased step clearance; Steppage gait Distance (ft): 10 Feet (ft) Assistive Device: Walker, rolling Ambulation - Level of Assistance: Contact guard assistance Interventions: Safety awareness training;Verbal cues Body Structures Involved: 1. Muscles Body Functions Affected: 1. Movement Related Activities and Participation Affected: 1. General Tasks and Demands 2. Mobility 3. Self Care Number of elements that affect the Plan of Care: 4+: HIGH COMPLEXITY CLINICAL PRESENTATION:  
Presentation: Evolving clinical presentation with changing clinical characteristics: MODERATE COMPLEXITY CLINICAL DECISION MAKIN[address] [place] [address] AM-PAC 6 Clicks Basic Mobility Inpatient Short Form How much difficulty does the patient currently have. .. Unable A Lot A Little None 1. Turning over in bed (including adjusting bedclothes, sheets and blankets)? [] 1   [] 2   [x] 3   [] 4  
2. Sitting down on and standing up from a chair with arms ( e.g., wheelchair, bedside commode, etc.)   [] 1   [] 2   [x] 3   [] 4  
3. Moving from lying on back to sitting on the side of the bed? [] 1   [] 2   [x] 3   [] 4 How much help from another person does the patient currently need. .. Total A Lot A Little None 4. Moving to and from a bed to a chair (including a wheelchair)? [] 1   [] 2   [x] 3   [] 4  
5. Need to walk in hospital room? [] 1   [] 2   [x] 3   [] 4  
6. Climbing 3-5 steps with a railing? [] 1   [] 2   [x] 3   [] 4  
© 2007, Trustees of [address] [place] [address] [address] [facility], under license to [employer]. All rights reserved Score:  Initial: 18 Most Recent: X (Date: -- ) Interpretation of Tool:  Represents activities that are increasingly more difficult (i.e. Bed mobility, Transfers, Gait). Score 24 23 22-20 19-15 14-10 9-7 6 Modifier CH CI CJ CK CL CM CN   
 
? Mobility - Walking and Moving Around:  
  - CURRENT STATUS: CK - 40%-59% impaired, limited or restricted  - GOAL STATUS: CJ - 20%-39% impaired, limited or restricted  - D/C STATUS:  ---------------To be determined--------------- Payor: Day / Plan: [address] [place] [address] Avenue / Product Type: Managed Care Medicaid /   
 
Medical Necessity:    
· Patient is expected to demonstrate progress in strength, range of motion, balance and coordination to decrease assistance required with overall functional mobility, transfers, ambulation. · Patient demonstrates good rehab potential due to higher previous functional level. Reason for Services/Other Comments: 
· Patient continues to require present interventions due to patient's inability to perform bed mobility, transfers, ambulation safely and effectively. Use of outcome tool(s) and clinical judgement create a POC that gives a: Clear prediction of patient's progress: LOW COMPLEXITY  
  
 
 
 
TREATMENT:  
(In addition to Assessment/Re-Assessment sessions the following treatments were rendered) Pre-treatment Symptoms/Complaints:  \"I am a simple man. \" 
Pain: Initial:  
Pain Intensity 1: (Not rated - just given pain medication)  Post Session:  Not rated Therapeutic Activity: (    12 Minutes):   Therapeutic activities including Bed transfers, Chair transfers, Toilet transfers and Ambulation on level ground to improve mobility, strength, balance and activity tolerance. Required minimal Safety awareness training;Verbal cues to promote static and dynamic balance in standing. Therapeutic Exercise: (13 Minutes):  Exercises per grid below to improve mobility, strength and activity tolerance. Required minimal visual and verbal cues to promote proper body mechanics. Progressed repetitions and complexity of movement as indicated. Date: 
10-29-18 Date: 
 Date: Activity/Exercise Parameters Parameters Parameters Seated ankle pumps x15 Seated LAQ x15 Seated marching x15 Braces/Orthotics/Lines/Etc:  
· IV · Left AFO 
· O2 Device: Room air Treatment/Session Assessment:   
· Response to Treatment:  Fair tolerance, limited by pain · Interdisciplinary Collaboration:  
o Physical Therapy Assistant 
o Registered Nurse · After treatment position/precautions:  
o Up in chair 
o Bed/Chair-wheels locked 
o Bed in low position 
o Call light within reach 
o RN notified · Compliance with Program/Exercises: Will assess as treatment progresses · Recommendations/Intent for next treatment session: \"Next visit will focus on advancements to more challenging activities\". Total Treatment Duration: PT Patient Time In/Time Out Time In: 1130 Time Out: 1155 Elise Carrasco PTA

## 2018-10-29 NOTE — PROGRESS NOTES
Bedside and Verbal shift change report given to self (oncoming nurse) by Mindi Segura RN (offgoing nurse). Report included the following information SBAR, Kardex, MAR and Recent Results.

## 2018-10-30 PROCEDURE — 74011250636 HC RX REV CODE- 250/636: Performed by: ANESTHESIOLOGY

## 2018-10-30 PROCEDURE — 74011000258 HC RX REV CODE- 258: Performed by: SURGERY

## 2018-10-30 PROCEDURE — 74011250637 HC RX REV CODE- 250/637: Performed by: SURGERY

## 2018-10-30 PROCEDURE — 74011250636 HC RX REV CODE- 250/636: Performed by: SURGERY

## 2018-10-30 PROCEDURE — 97530 THERAPEUTIC ACTIVITIES: CPT

## 2018-10-30 PROCEDURE — 65660000004 HC RM CVT STEPDOWN

## 2018-10-30 PROCEDURE — 97116 GAIT TRAINING THERAPY: CPT

## 2018-10-30 PROCEDURE — 74011250637 HC RX REV CODE- 250/637: Performed by: PHYSICIAN ASSISTANT

## 2018-10-30 RX ORDER — CYCLOBENZAPRINE HCL 10 MG
10 TABLET ORAL
Status: DISCONTINUED | OUTPATIENT
Start: 2018-10-30 | End: 2018-10-31 | Stop reason: HOSPADM

## 2018-10-30 RX ORDER — LISINOPRIL 5 MG/1
2.5 TABLET ORAL 2 TIMES DAILY
Status: DISCONTINUED | OUTPATIENT
Start: 2018-10-30 | End: 2018-10-31 | Stop reason: HOSPADM

## 2018-10-30 RX ORDER — ATORVASTATIN CALCIUM 40 MG/1
40 TABLET, FILM COATED ORAL
Status: DISCONTINUED | OUTPATIENT
Start: 2018-10-30 | End: 2018-10-31 | Stop reason: HOSPADM

## 2018-10-30 RX ADMIN — HYDROMORPHONE HYDROCHLORIDE 0.5 MG: 1 INJECTION, SOLUTION INTRAMUSCULAR; INTRAVENOUS; SUBCUTANEOUS at 07:13

## 2018-10-30 RX ADMIN — HEPARIN SODIUM 5000 UNITS: 5000 INJECTION INTRAVENOUS; SUBCUTANEOUS at 05:50

## 2018-10-30 RX ADMIN — Medication 10 ML: at 05:50

## 2018-10-30 RX ADMIN — CLONAZEPAM 1 MG: 1 TABLET ORAL at 08:24

## 2018-10-30 RX ADMIN — Medication 10 ML: at 21:22

## 2018-10-30 RX ADMIN — ONDANSETRON 4 MG: 2 INJECTION INTRAMUSCULAR; INTRAVENOUS at 18:01

## 2018-10-30 RX ADMIN — Medication 10 ML: at 05:51

## 2018-10-30 RX ADMIN — LISINOPRIL 2.5 MG: 5 TABLET ORAL at 18:01

## 2018-10-30 RX ADMIN — DEXTROSE MONOHYDRATE AND SODIUM CHLORIDE 75 ML/HR: 5; .45 INJECTION, SOLUTION INTRAVENOUS at 03:15

## 2018-10-30 RX ADMIN — ATORVASTATIN CALCIUM 40 MG: 40 TABLET, FILM COATED ORAL at 21:20

## 2018-10-30 RX ADMIN — METOPROLOL TARTRATE 25 MG: 25 TABLET ORAL at 08:24

## 2018-10-30 RX ADMIN — HYDROMORPHONE HYDROCHLORIDE 0.25 MG: 2 INJECTION, SOLUTION INTRAMUSCULAR; INTRAVENOUS; SUBCUTANEOUS at 16:23

## 2018-10-30 RX ADMIN — LISINOPRIL 2.5 MG: 5 TABLET ORAL at 10:55

## 2018-10-30 RX ADMIN — HYDROMORPHONE HYDROCHLORIDE 0.5 MG: 1 INJECTION, SOLUTION INTRAMUSCULAR; INTRAVENOUS; SUBCUTANEOUS at 09:19

## 2018-10-30 RX ADMIN — CITALOPRAM HYDROBROMIDE 40 MG: 20 TABLET ORAL at 08:24

## 2018-10-30 RX ADMIN — HEPARIN SODIUM 5000 UNITS: 5000 INJECTION INTRAVENOUS; SUBCUTANEOUS at 21:19

## 2018-10-30 RX ADMIN — HYDROMORPHONE HYDROCHLORIDE 0.5 MG: 1 INJECTION, SOLUTION INTRAMUSCULAR; INTRAVENOUS; SUBCUTANEOUS at 12:09

## 2018-10-30 RX ADMIN — CLONAZEPAM 1 MG: 1 TABLET ORAL at 18:00

## 2018-10-30 RX ADMIN — ACETAMINOPHEN 650 MG: 325 TABLET ORAL at 23:34

## 2018-10-30 RX ADMIN — CYCLOBENZAPRINE HYDROCHLORIDE 10 MG: 10 TABLET, FILM COATED ORAL at 13:38

## 2018-10-30 RX ADMIN — MORPHINE SULFATE 2 MG: 2 INJECTION, SOLUTION INTRAMUSCULAR; INTRAVENOUS at 18:01

## 2018-10-30 RX ADMIN — HEPARIN SODIUM 5000 UNITS: 5000 INJECTION INTRAVENOUS; SUBCUTANEOUS at 12:13

## 2018-10-30 RX ADMIN — CYCLOBENZAPRINE HYDROCHLORIDE 10 MG: 10 TABLET, FILM COATED ORAL at 21:20

## 2018-10-30 RX ADMIN — METOPROLOL TARTRATE 25 MG: 25 TABLET ORAL at 21:20

## 2018-10-30 NOTE — PROGRESS NOTES
Patient tolerated clear liquid tray without any nausea/vomiting or pain. Consumed 50% of [facility] lunch tray. +BS on assessment +flatus per patient. No BM as of yet.

## 2018-10-30 NOTE — PROGRESS NOTES
Problem: Mobility Impaired (Adult and Pediatric) Goal: *Acute Goals and Plan of Care (Insert Text) LTG: 
(1.)Mr. Mason will move from supine to sit and sit to supine , scoot up and down and roll side to side with INDEPENDENT within 7 treatment day(s) through log rolling from flat surface without handrail. (2.)Mr. Mason will transfer from bed to chair and chair to bed with MODIFIED INDEPENDENCE using the least restrictive device within 7 treatment day(s). (3.)Mr. Mason will ambulate with MODIFIED INDEPENDENCE for 500+ feet with the least restrictive device within 7 treatment day(s) while maintaining normal vital signs. (4.)Mr. Mason will be independent with HEP for B LE mobility and strengthening within 7 treatment days. _________________________________________________________________________________________ PHYSICAL THERAPY: Daily Note, Treatment Day: 2nd, AM 10/30/2018INPATIENT: Hospital Day: 5 Payor: Day / Plan: [address] [place] [address] Avenue / Product Type: Managed Care Medicaid /  
  
NAME/AGE/GENDER: Panfilo Palencia is a 46 y.o. male PRIMARY DIAGNOSIS: Atherosclerosis of native artery of left lower extremity with rest pain (HCC) [I70.222] [address]iliac occlusive disease ([facility] Aortoiliac occlusive disease ([facility] Procedure(s) (LRB): 
AORTA BI FEMORAL BYPASS (Bilateral) 4 Days Post-Op ICD-10: Treatment Diagnosis:  
 · Generalized Muscle Weakness (M62.81) · Difficulty in walking, Not elsewhere classified (R26.2) Precaution/Allergies: 
Patient has no known allergies. ASSESSMENT:  
Mr. James Albright presents sitting up in bedside chair. He is agreeable to treatment. He stood from chair with SBA and increased his ambulation with rolling walker. He ambulated with SBA. He was instructed in toe off and heel strike during gait training with no AFO. He did have toe off on left lower extremity during ambulation.  His gait is slow and shuffled with a steppage gait on left lower extremity. He is progressing well with mobility. Will continue PT efforts. This section established at most recent assessment PROBLEM LIST (Impairments causing functional limitations): 1. Decreased ADL/Functional Activities 2. Decreased Transfer Abilities 3. Decreased Ambulation Ability/Technique 4. Decreased Balance 5. Increased Pain 6. Decreased Activity Tolerance 7. Decreased [place] with Home Exercise Program 
 INTERVENTIONS PLANNED: (Benefits and precautions of physical therapy have been discussed with the patient.) 1. Balance Exercise 2. Bed Mobility 3. Family Education 4. Gait Training 5. Home Exercise Program (HEP) 6. Therapeutic Activites 7. Therapeutic Exercise/Strengthening 8. Transfer Training TREATMENT PLAN: Frequency/Duration: daily for duration of hospital stay Rehabilitation Potential For Stated Goals: Good RECOMMENDED REHABILITATION/EQUIPMENT: (at time of discharge pending progress): Due to the probability of continued deficits (see above) this patient will not likely need continued skilled physical therapy after discharge. Equipment:  
? None at this time HISTORY:  
History of Present Injury/Illness (Reason for Referral): 
58-year-old male that presents for evaluation of an ischemic left leg.  He is also history of cigarette smoking and according to him he had a crush injury on the job where he feels like he occluded the femoral artery on the left.  The details of his treatment for their own became fairly unclear however does sound like he was treated [facility] and had some stents placed in the left iliac system.  He developed new discomfort and coolness as well as numbness in the left leg and foot mostly below the knee and came back for further evaluation. Alex Kovacs had an ultrasound done this morning that demonstrated the left iliac system to be occluded.  He complains primarily of numbness and also has pain in his left foot as well at rest. 
  
He also had what sounds like a right hemispheric stroke that left him with left hemiparesis with some residual symptoms on the left side and the left hand where he has a mild degree of clumsiness and he also has foot drop on the left.  I believe that the foot drop was a result of the stroke rather than chronic ischemia.  However despite that he is able to walk with the assistance of an AFO.  He really continues to work.  Unfortunately continues to smoke but he is cut down to what he describes about 4 cigarettes/day. Kendell Irving denies any cardiopulmonary disease or previous coronary issues.  He lives in [place] he is accompanied today by his wife Past Medical History/Comorbidities:  
Mr. Yusef Christopher  has a past medical history of Anxiety disorder, Chronic pain, CVA (cerebral vascular accident) ([address] Depression, Hernia, inguinal, Hypercholesterolemia, Hypertension, and Trauma. Mr. Yusef Christopher  has a past surgical history that includes vascular surgery procedure unlist (Left, last one placed 17 yrs ago) and AORTA BI FEMORAL BYPASS (Bilateral, 10/26/2018). Social History/Living Environment:  
Home Environment: Private residence # Steps to Enter: 0 Wheelchair Ramp: Yes One/Two Story Residence: One story Living Alone: No 
Support Systems: Family member(s) Patient Expects to be Discharged to[de-identified] Private residence Current DME Used/Available at Home: Cane, straight, Grab bars(AFO L LE) Tub or Shower Type: Tub/Shower combination Prior Level of Function/Work/Activity: 
Lives with spouse; amb with cane, wears AFO L LE; indep ADLs, drives, does not work Personal Factors:   
      Sex:  male Age:  46 y.o. Overall Behavior:  Agreeable, somewhat impatient Number of Personal Factors/Comorbidities that affect the Plan of Care: CVA 1-2: MODERATE COMPLEXITY EXAMINATION:  
Most Recent Physical Functioning:  
Gross Assessment: 
  
         
  
Posture: Posture (WDL): Within defined limits Balance: 
Sitting: Impaired Sitting - Static: Good (unsupported) Sitting - Dynamic: Fair (occasional) Standing: Impaired Standing - Static: Fair Standing - Dynamic : Fair Bed Mobility: 
  
Wheelchair Mobility: 
  
Transfers: 
Sit to Stand: Stand-by assistance Stand to Sit: Stand-by assistance Gait: 
  
Base of Support: Narrowed Speed/Valarie: Slow;Shuffled Step Length: Left shortened;Right shortened Swing Pattern: Left symmetrical 
Gait Abnormalities: Antalgic;Decreased step clearance;Shuffling gait; Steppage gait Distance (ft): 250 Feet (ft) Assistive Device: Walker, rolling Ambulation - Level of Assistance: Contact guard assistance Interventions: Safety awareness training;Verbal cues Body Structures Involved: 1. Muscles Body Functions Affected: 1. Movement Related Activities and Participation Affected: 1. General Tasks and Demands 2. Mobility 3. Self Care Number of elements that affect the Plan of Care: 4+: HIGH COMPLEXITY CLINICAL PRESENTATION:  
Presentation: Evolving clinical presentation with changing clinical characteristics: MODERATE COMPLEXITY CLINICAL DECISION MAKIN[address] [place] [address] AM-PAC 6 Clicks Basic Mobility Inpatient Short Form How much difficulty does the patient currently have. .. Unable A Lot A Little None 1. Turning over in bed (including adjusting bedclothes, sheets and blankets)? [] 1   [] 2   [x] 3   [] 4  
2. Sitting down on and standing up from a chair with arms ( e.g., wheelchair, bedside commode, etc.)   [] 1   [] 2   [x] 3   [] 4  
3. Moving from lying on back to sitting on the side of the bed? [] 1   [] 2   [x] 3   [] 4 How much help from another person does the patient currently need. .. Total A Lot A Little None 4. Moving to and from a bed to a chair (including a wheelchair)? [] 1   [] 2   [x] 3   [] 4  
5. Need to walk in hospital room?    [] 1   [] 2   [x] 3   [] 4  
 6.  Climbing 3-5 steps with a railing? [] 1   [] 2   [x] 3   [] 4  
© 2007, Trustees of [address] [place] [address] [address] [address], under license to [employer]. All rights reserved Score:  Initial: 18 Most Recent: X (Date: -- ) Interpretation of Tool:  Represents activities that are increasingly more difficult (i.e. Bed mobility, Transfers, Gait). Score 24 23 22-20 19-15 14-10 9-7 6 Modifier CH CI CJ CK CL CM CN   
 
? Mobility - Walking and Moving Around:  
  - CURRENT STATUS: CK - 40%-59% impaired, limited or restricted  - GOAL STATUS: CJ - 20%-39% impaired, limited or restricted  - D/C STATUS:  ---------------To be determined--------------- Payor: Day / Plan: [address] [place] [address] Avenue / Product Type: Managed Care Medicaid /   
 
Medical Necessity:    
· Patient is expected to demonstrate progress in strength, range of motion, balance and coordination to decrease assistance required with overall functional mobility, transfers, ambulation. · Patient demonstrates good rehab potential due to higher previous functional level. Reason for Services/Other Comments: 
· Patient continues to require present interventions due to patient's inability to perform bed mobility, transfers, ambulation safely and effectively. Use of outcome tool(s) and clinical judgement create a POC that gives a: Clear prediction of patient's progress: LOW COMPLEXITY  
  
 
 
 
TREATMENT:  
(In addition to Assessment/Re-Assessment sessions the following treatments were rendered) Pre-treatment Symptoms/Complaints:  \"I want some coffee. \" 
Pain: Initial:  
Pain Intensity 1: 0  Post Session:  Not rated Therapeutic Activity: (    23 Minutes): Therapeutic activities including Chair transfers, and Ambulation on level ground to improve mobility, strength, balance and activity tolerance. Required minimal Safety awareness training;Verbal cues to promote static and dynamic balance in standing. Therapeutic Exercise: ( ):  Exercises per grid below to improve mobility, strength and activity tolerance. Required minimal visual and verbal cues to promote proper body mechanics. Progressed repetitions and complexity of movement as indicated. Date: 
10-29-18 Date: 
 Date: Activity/Exercise Parameters Parameters Parameters Seated ankle pumps x15 Seated LAQ x15 Seated marching x15 Braces/Orthotics/Lines/Etc:  
· IV 
· O2 Device: Room air Treatment/Session Assessment:   
· Response to Treatment:  Tolerated with fatigue with mobility · Interdisciplinary Collaboration:  
o Physical Therapy Assistant 
o Registered Nurse · After treatment position/precautions:  
o Up in chair 
o Bed/Chair-wheels locked 
o Bed in low position 
o Call light within reach 
o RN notified · Compliance with Program/Exercises: Will assess as treatment progresses · Recommendations/Intent for next treatment session: \"Next visit will focus on advancements to more challenging activities\". Total Treatment Duration: PT Patient Time In/Time Out Time In: 1030 Time Out: 6200 [place] Setting, PTA

## 2018-10-30 NOTE — PROGRESS NOTES
Bedside and Verbal shift change report given to Jonathan Blue RN (oncoming nurse) by self Ayden schaefer). Report included the following information SBAR, Kardex, MAR and Recent Results.

## 2018-10-30 NOTE — PROGRESS NOTES
Progress Note Patient: Renata Lancaster MRN: [id number]  SSN: [id number] YOB: 1965  Age: 46 y.o. Sex: male Admission Date: 10/26/2018 LOS: 4 days 4 Days Post-Op PROCEDURE(S): 
Aortobifemoral bypass Subjective:  
 
Patient OOB in chair, reports mild \"stinging pain\" in groins bilaterally, states abdominal incision pain has resolved. Otherwise, has no complaints. Denies nausea, reports much flatus, no BM. Objective:  
 
Vitals:  
 10/30/18 0012 10/30/18 0310 10/30/18 0553 10/30/18 0700 BP: 143/84 148/85  (!) 163/96 Pulse: (!) 102 (!) 105  (!) 112 Resp:  16  18 Temp:  99.6 °F (37.6 °C)  97.5 °F (36.4 °C) SpO2:  92%  93% Weight:   156 lb 3.2 oz (70.9 kg) Height:      
  
 
Intake and Output: 
Current Shift: No intake/output data recorded. Last three shifts: 10/28 1901 - 10/30 0700 In: 6215 [P.O.:450; I.V.:3290] Out: 1850 [QJKEH:3530] Physical Exam:  
GENERAL: alert, cooperative, no distress LUNG: clear to auscultation bilaterally, normal respiratory effort HEART: regular rate and rhythm ABDOMEN: soft, nontender, nondistended, bowel sounds with normal pitch / character / frequency; midline incision intact with staples, scant drainage, no erythema / induration / ecchymosis EXTREMITIES: warm, normal ROM; bilateral groins with incisions intact, moderate ecchymosis, mild erythema and induration, no hematoma Lab/Data Review: All lab results for the last 24 hours reviewed. Assessment / Plan:  
 
Principal Problem: [facility]iliac occlusive [facility] ([facility] (10/29/2018) Active Problems: 
  PAD (peripheral artery disease) ([facility] (10/26/2018) Tobacco use disorder, continuous (10/29/2018) CLD 
OOB, ambulate - appreciate PT If CLD tolerated, will transition to PO pain control Possible D/C home tomorrow Signed By: Chuy Carballo PA-C October 30, 2018 Physician Assistant with [facility] Vascular Surgery Christopher Norton.  Freddy Sharif MD / Beka Hobson MD

## 2018-10-30 NOTE — PROGRESS NOTES
Bedside and Verbal shift change report given to self (oncoming nurse) by Sangeeta Conti (offgoing nurse). Report included the following information SBAR, Kardex, MAR and Recent Results.

## 2018-10-30 NOTE — PROGRESS NOTES
Encouraged patient to space out narcotic pain medicine as can be tolerated to better transition to PO pain meds once ordered. Discussed narcotics side effect of constipation with patient and significant other. They verbalize understanding.

## 2018-10-31 VITALS
TEMPERATURE: 98.4 F | BODY MASS INDEX: 25.07 KG/M2 | HEART RATE: 102 BPM | OXYGEN SATURATION: 100 % | WEIGHT: 156 LBS | HEIGHT: 66 IN | RESPIRATION RATE: 17 BRPM | SYSTOLIC BLOOD PRESSURE: 146 MMHG | DIASTOLIC BLOOD PRESSURE: 97 MMHG

## 2018-10-31 PROBLEM — Z95.828 S/P AORTOBIFEMORAL BYPASS SURGERY: Status: ACTIVE | Noted: 2018-10-31

## 2018-10-31 PROCEDURE — 74011250636 HC RX REV CODE- 250/636: Performed by: SURGERY

## 2018-10-31 PROCEDURE — 97530 THERAPEUTIC ACTIVITIES: CPT

## 2018-10-31 PROCEDURE — 74011000258 HC RX REV CODE- 258: Performed by: SURGERY

## 2018-10-31 PROCEDURE — 74011250637 HC RX REV CODE- 250/637: Performed by: SURGERY

## 2018-10-31 PROCEDURE — 74011250637 HC RX REV CODE- 250/637: Performed by: PHYSICIAN ASSISTANT

## 2018-10-31 RX ORDER — CLOPIDOGREL BISULFATE 75 MG/1
75 TABLET ORAL DAILY
Qty: 30 TAB | Refills: 5 | Status: SHIPPED | OUTPATIENT
Start: 2018-10-31 | End: 2018-10-31

## 2018-10-31 RX ORDER — OXYCODONE AND ACETAMINOPHEN 5; 325 MG/1; MG/1
1 TABLET ORAL
Qty: 30 TAB | Refills: 0 | Status: SHIPPED | OUTPATIENT
Start: 2018-10-31 | End: 2019-06-12 | Stop reason: CLARIF

## 2018-10-31 RX ORDER — OXYCODONE AND ACETAMINOPHEN 5; 325 MG/1; MG/1
1 TABLET ORAL
Status: DISCONTINUED | OUTPATIENT
Start: 2018-10-31 | End: 2018-10-31 | Stop reason: HOSPADM

## 2018-10-31 RX ADMIN — ACETAMINOPHEN 650 MG: 325 TABLET ORAL at 09:13

## 2018-10-31 RX ADMIN — HEPARIN SODIUM 5000 UNITS: 5000 INJECTION INTRAVENOUS; SUBCUTANEOUS at 05:42

## 2018-10-31 RX ADMIN — CITALOPRAM HYDROBROMIDE 40 MG: 20 TABLET ORAL at 09:16

## 2018-10-31 RX ADMIN — DEXTROSE MONOHYDRATE AND SODIUM CHLORIDE 75 ML/HR: 5; .45 INJECTION, SOLUTION INTRAVENOUS at 04:40

## 2018-10-31 RX ADMIN — OXYCODONE HYDROCHLORIDE AND ACETAMINOPHEN 1 TABLET: 5; 325 TABLET ORAL at 13:19

## 2018-10-31 RX ADMIN — Medication 10 ML: at 05:44

## 2018-10-31 RX ADMIN — LISINOPRIL 2.5 MG: 5 TABLET ORAL at 09:16

## 2018-10-31 RX ADMIN — METOPROLOL TARTRATE 25 MG: 25 TABLET ORAL at 09:15

## 2018-10-31 RX ADMIN — CLONAZEPAM 1 MG: 1 TABLET ORAL at 09:15

## 2018-10-31 NOTE — PROGRESS NOTES
Bedside and Verbal shift change report given to Jenni Hernandez (oncoming nurse) by self (offgoing nurse). Report included the following information SBAR, Kardex, MAR and Recent Results.

## 2018-10-31 NOTE — DISCHARGE SUMMARY
[address] [facility]          Physician Discharge Summary     Patient: Giana Rincon MRN: [id number]  SSN: [id number]    YOB: 1965  Age: 46 y.o. Sex: male       Admission Date: 10/26/2018    Discharge Date: 10/31/2018      Admitting Physician: Gordy Bautista MD     Discharge Provider: Lorenza Jaime PA-C    Admission Diagnoses: Atherosclerosis of native artery of left lower extremity with rest pain [facility]) [I70.222]    Discharge Diagnoses:   Problem List as of 10/31/2018 Date Reviewed: 10/31/2018          Codes Class Noted - Resolved    S/P aortobifemoral bypass surgery ICD-10-CM: Z95.828  ICD-9-CM: V45.89  10/31/2018 - Present    Overview Signed 10/31/2018  9:59 AM by ERNESTINA Huber     10/26/2018, Gordy Bautista MD             * (Principal) Aortoiliac occlusive disease [facility]) ICD-10-CM: M94.84  ICD-9-CM: 444.09  10/29/2018 - Present        Tobacco use disorder, continuous ICD-10-CM: F17.209  ICD-9-CM: 305.1  10/29/2018 - Present        PAD [facility]peripheral artery disease) ([facility] ICD-10-CM: I73.9  ICD-9-CM: 443.9  10/26/2018 - Present               Procedures for this Admission: Procedure(s):  AORTA BI FEMORAL BYPASS    Discharged Condition: stable    Brief History: Giana Rincon was admitted with the following history of present illness. The patient is a 47yo male cigarette smoker who presented to our office for evaluation of ischemic left leg. 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 Following non-invasive vascular studies, Dr. Sonya Blackburn recommended aortobifemoral bypass, and after discussing need for surgery, risks, alternatives, potential complications and anticipated outcomes, the patient elected to proceed. Hospital Course: On day of admission, the patient was taken to the operating room and underwent the above-noted procedure.  After extubation in the OR, he was transferred to CV ICU to begin formal recovery. Patient's pain was managed initially by an epidural. His epidural was discontinued on POD #2, and his pain was controlled with IV and oral medications. On POD #2 he was stable and was transferred to  Stepdown to continue convalescence. He voided without difficulty. He participated actively with physical therapy. His bowel function gradually returned. He tolerated a clear liquid diet. He continued to progress uneventfully. On POD #5, he was stable and deemed suitable for discharge to home. Consults: None    Significant Diagnostic Studies: labs    Treatments:   IV hydration  analgesia: acetaminophen, Dilaudid, morphine, oxycodone w/ acetaminophen, epidural  cardiac meds: lisinopril, metoprolol, hydralazine  anticoagulation: ASA, Heparin  surgery: as noted above    Discharge Exam:  GENERAL: alert, cooperative, no distress  EYE: EOM intact, no nystagmus  LUNG: clear to auscultation bilaterally, normal respiratory effort  HEART: regular rate and rhythm  ABDOMEN: soft, nontender, nondistended, bowel sounds normoactive; midline incision intact with staples, no drainage / erythema / induration / ecchymosis  EXTREMITIES: warm, good ROM; bilateral groin incisions intact, mild erythema and induration (stable), no hematoma  PULSES: dorsalis pedis and posterior tibialis easily palpable    Disposition: home      Patient Instructions:   Current Discharge Medication List      START taking these medications    Details   oxyCODONE-acetaminophen (PERCOCET) 5-325 mg per tablet Take 1 Tab by mouth every four (4) hours as needed. Max Daily Amount: 6 Tabs. Qty: 30 Tab, Refills: 0    Associated Diagnoses: S/P aortobifemoral bypass surgery         CONTINUE these medications which have NOT CHANGED    Details   aspirin (ASPIRIN) 325 mg tablet Take 325 mg by mouth daily. atorvastatin (LIPITOR) 40 mg tablet Take 40 mg by mouth nightly.       baclofen (LIORESAL) 10 mg tablet Take 10 mg by mouth three (3) times daily as needed. Only takes in am      citalopram (CELEXA) 40 mg tablet Take 40 mg by mouth daily. clonazePAM (KLONOPIN) 1 mg tablet Take  by mouth two (2) times a day. cyclobenzaprine (FLEXERIL) 10 mg tablet Take 10 mg by mouth three (3) times daily as needed for Muscle Spasm(s). Only takes at hs      lisinopril (PRINIVIL, ZESTRIL) 2.5 mg tablet Take 2.5 mg by mouth two (2) times a day. metoprolol tartrate (LOPRESSOR) 25 mg tablet Take  by mouth two (2) times a day. nitroglycerin (NITROSTAT) 0.4 mg SL tablet 0.4 mg by SubLINGual route every five (5) minutes as needed for Chest Pain (PRN). Up to 3 doses. STOP taking these medications       HYDROcodone-acetaminophen (NORCO) 7.5-325 mg per tablet Comments:   Reason for Stopping:               Reference MD discharge instructions, as well as those provided by nursing for diet, labs, medications, activity, wound care and any outpatient referrals. I have reviewed the patients controlled substance prescription history as maintained in the [place] prescription monitoring program so that the prescription for a controlled substance can be given. Follow-up Appointments   Procedures    FOLLOW UP VISIT Appointment in: Other (Specify) Follow up in office with Dr. Aiyana Castañeda on 11/8/2018 at 1:15 PM.     Follow up in office with Dr. Aiyana Castañeda on 11/8/2018 at 1:15 PM.       Signed: Keith Man PA-C  10/31/2018 10:07 AM  Physician Assistant with [facility] Vascular Surgery  [place].  Ankita Lai MD / Tiffani Maradiaga MD

## 2018-10-31 NOTE — PROGRESS NOTES
Progress Note Patient: Bismark Day MRN: [id number]  SSN: [id number] YOB: 1965  Age: 46 y.o. Sex: male Admission Date: 10/26/2018 LOS: 5 days 5 Days Post-Op PROCEDURE(S): 
AORTA BI FEMORAL BYPASS Subjective:  
 
Patient has no complaints, +flatus, has mobilized with PT. Pain mild, patient declined pain meds. Tolerated CLD. Angella Cobble to go home. Objective:  
 
Vitals:  
 10/31/18 2166 10/31/18 0067 10/31/18 4314 10/31/18 0827 BP:      
Pulse: (!) 130 (!) 119 (!) 130 (!) 117 Resp:      
Temp:      
SpO2:      
Weight:      
Height:      
  
 
Intake and Output: 
Current Shift: 10/31 0701 - 10/31 1900 [facility]: -  
[facility] Last three shifts: 10/29 1901 - 10/31 0700 In: 6336 [Y.U.:2473] Out: 6153 [OHPFP:0372] Physical Exam:  
GENERAL: alert, cooperative, no distress EYE: EOM intact, no nystagmus LUNG: clear to auscultation bilaterally, normal respiratory effort HEART: regular rate and rhythm ABDOMEN: soft, nontender, nondistended, bowel sounds normoactive; midline incision intact with staples, no drainage / erythema / induration / ecchymosis EXTREMITIES: warm, good ROM; bilateral groin incisions intact, mild erythema and induration (stable), no hematoma PULSES: dorsalis pedis and posterior tibialis easily palpable Lab/Data Review: All lab results for the last 24 hours reviewed. Assessment / Plan:  
 
Principal Problem: Aortoiliac occlusive disease ([facility] (10/29/2018) Active Problems: 
  PAD (peripheral artery disease) ([facility] (10/26/2018) Tobacco use disorder, continuous (10/29/2018) Likely D/C home later Signed By: Nelson Starks PA-C October 31, 2018 Physician Assistant with [facility] Vascular Surgery Irenekranthi James.  Timothy Parker MD / Alexis Hernandez MD

## 2018-10-31 NOTE — PROGRESS NOTES
Problem: Mobility Impaired (Adult and Pediatric) Goal: *Acute Goals and Plan of Care (Insert Text) LTG: 
(1.)Mr. Mason will move from supine to sit and sit to supine , scoot up and down and roll side to side with INDEPENDENT within 7 treatment day(s) through log rolling from flat surface without handrail. (2.)Mr. Mason will transfer from bed to chair and chair to bed with MODIFIED INDEPENDENCE using the least restrictive device within 7 treatment day(s). (3.)Mr. Mason will ambulate with MODIFIED INDEPENDENCE for 500+ feet with the least restrictive device within 7 treatment day(s) while maintaining normal vital signs. (4.)Mr. Mason will be independent with HEP for B LE mobility and strengthening within 7 treatment days. _________________________________________________________________________________________ PHYSICAL THERAPY: Daily Note, Treatment Day: 3rd, AM 10/31/2018INPATIENT: Hospital Day: 6 Payor: Day / Plan: [address] [place] [address] Avenue / Product Type: Managed Care Medicaid /  
  
NAME/AGE/GENDER: Kimberlee Glass is a 46 y.o. male PRIMARY DIAGNOSIS: Atherosclerosis of native artery of left lower extremity with rest pain ([facility]) [I70.222] Aortoiliac occlusive disease ([address] Aortoiliac occlusive disease ([facility] Procedure(s) (LRB): 
AORTA BI FEMORAL BYPASS (Bilateral) 5 Days Post-Op ICD-10: Treatment Diagnosis:  
 · Generalized Muscle Weakness (M62.81) · Difficulty in walking, Not elsewhere classified (R26.2) Precaution/Allergies: 
Patient has no known allergies. ASSESSMENT:  
Mr. Cassandra Cortez presents sitting up in bedside chair. He is agreeable to treatment. He maintained his ambulation distance with rolling walker. He ascended/descended 3 steps with SBA and verbal cues. He did ambulate with no rolling walker upon return to room. He is progressing well toward all goals. Will continue PT efforts. This section established at most recent assessment PROBLEM LIST (Impairments causing functional limitations): 1. Decreased ADL/Functional Activities 2. Decreased Transfer Abilities 3. Decreased Ambulation Ability/Technique 4. Decreased Balance 5. Increased Pain 6. Decreased Activity Tolerance 7. Decreased [place] with Home Exercise Program 
 INTERVENTIONS PLANNED: (Benefits and precautions of physical therapy have been discussed with the patient.) 1. Balance Exercise 2. Bed Mobility 3. Family Education 4. Gait Training 5. Home Exercise Program (HEP) 6. Therapeutic Activites 7. Therapeutic Exercise/Strengthening 8. Transfer Training TREATMENT PLAN: Frequency/Duration: daily for duration of hospital stay Rehabilitation Potential For Stated Goals: Good RECOMMENDED REHABILITATION/EQUIPMENT: (at time of discharge pending progress): Due to the probability of continued deficits (see above) this patient will not likely need continued skilled physical therapy after discharge. Equipment:  
? None at this time HISTORY:  
History of Present Injury/Illness (Reason for Referral): 
58-year-old male that presents for evaluation of an ischemic left leg.  He is also history of cigarette smoking and according to him he had a crush injury on the job where he feels like he occluded the femoral artery on the left.  The details of his treatment for their own became fairly unclear however does sound like he was treated [facility] and had some stents placed in the left iliac system.  He developed new discomfort and coolness as well as numbness in the left leg and foot mostly below the knee and came back for further evaluation. Alex Kovacs had an ultrasound done this morning that demonstrated the left iliac system to be occluded.  He complains primarily of numbness and also has pain in his left foot as well at rest. 
  
He also had what sounds like a right hemispheric stroke that left him with left hemiparesis with some residual symptoms on the left side and the left hand where he has a mild degree of clumsiness and he also has foot drop on the left.  I believe that the foot drop was a result of the stroke rather than chronic ischemia.  However despite that he is able to walk with the assistance of an AFO.  He really continues to work.  Unfortunately continues to smoke but he is cut down to what he describes about 4 cigarettes/day. Jana Barthel denies any cardiopulmonary disease or previous coronary issues.  He lives in [place] he is accompanied today by his wife Past Medical History/Comorbidities:  
Mr. Amanda Maurer  has a past medical history of Anxiety disorder, Chronic pain, CVA (cerebral vascular accident) ([address] Depression, Hernia, inguinal, Hypercholesterolemia, Hypertension, and Trauma. Mr. Amanda Maurer  has a past surgical history that includes vascular surgery procedure unlist (Left, last one placed 17 yrs ago) and AORTA BI FEMORAL BYPASS (Bilateral, 10/26/2018). Social History/Living Environment:  
Home Environment: Private residence # Steps to Enter: 0 Wheelchair Ramp: Yes One/Two Story Residence: One story Living Alone: No 
Support Systems: Family member(s) Patient Expects to be Discharged to[de-identified] Private residence Current DME Used/Available at Home: Cane, straight, Grab bars(AFO L LE) Tub or Shower Type: Tub/Shower combination Prior Level of Function/Work/Activity: 
Lives with spouse; amb with cane, wears AFO L LE; indep ADLs, drives, does not work Personal Factors:   
      Sex:  male Age:  46 y.o. Overall Behavior:  Agreeable, somewhat impatient Number of Personal Factors/Comorbidities that affect the Plan of Care: CVA 1-2: MODERATE COMPLEXITY EXAMINATION:  
Most Recent Physical Functioning:  
Gross Assessment: 
  
         
  
Posture: 
Posture (WDL): Within defined limits Balance: 
Sitting: Intact Sitting - Static: Good (unsupported) Sitting - Dynamic: Good (unsupported) Standing: Impaired Standing - Static: Fair Standing - Dynamic : Fair Bed Mobility: 
  
Wheelchair Mobility: 
  
Transfers: 
Sit to Stand: Supervision Stand to Sit: Supervision Gait: 
  
Base of Support: Narrowed Speed/Valarie: Shuffled Step Length: Left shortened;Right shortened Gait Abnormalities: Antalgic;Decreased step clearance Distance (ft): 250 Feet (ft) Assistive Device: Walker, rolling Ambulation - Level of Assistance: Stand-by assistance Number of Stairs Trained: 3 Stairs - Level of Assistance: Stand-by assistance Rail Use: Both Interventions: Safety awareness training Body Structures Involved: 1. Muscles Body Functions Affected: 1. Movement Related Activities and Participation Affected: 1. General Tasks and Demands 2. Mobility 3. Self Care Number of elements that affect the Plan of Care: 4+: HIGH COMPLEXITY CLINICAL PRESENTATION:  
Presentation: Evolving clinical presentation with changing clinical characteristics: MODERATE COMPLEXITY CLINICAL DECISION MAKING:  
[facility] AM-PAC 6 Clicks Basic Mobility Inpatient Short Form How much difficulty does the patient currently have. .. Unable A Lot A Little None 1. Turning over in bed (including adjusting bedclothes, sheets and blankets)? [] 1   [] 2   [x] 3   [] 4  
2. Sitting down on and standing up from a chair with arms ( e.g., wheelchair, bedside commode, etc.)   [] 1   [] 2   [x] 3   [] 4  
3. Moving from lying on back to sitting on the side of the bed? [] 1   [] 2   [x] 3   [] 4 How much help from another person does the patient currently need. .. Total A Lot A Little None 4. Moving to and from a bed to a chair (including a wheelchair)? [] 1   [] 2   [x] 3   [] 4  
5. Need to walk in hospital room? [] 1   [] 2   [x] 3   [] 4  
6. Climbing 3-5 steps with a railing? [] 1   [] 2   [x] 3   [] 4  
© 2007, Trustees of [facility], under license to [employer]. All rights reserved Score:  Initial: 18 Most Recent: X (Date: -- ) Interpretation of Tool:  Represents activities that are increasingly more difficult (i.e. Bed mobility, Transfers, Gait). Score 24 23 22-20 19-15 14-10 9-7 6 Modifier CH CI CJ CK CL CM CN   
 
? Mobility - Walking and Moving Around:  
  - CURRENT STATUS: CK - 40%-59% impaired, limited or restricted  - GOAL STATUS: CJ - 20%-39% impaired, limited or restricted  - D/C STATUS:  ---------------To be determined--------------- Payor: Day / Plan: [address] [place] [address] Avenue / Product Type: Managed Care Medicaid /   
 
Medical Necessity:    
· Patient is expected to demonstrate progress in strength, range of motion, balance and coordination to decrease assistance required with overall functional mobility, transfers, ambulation. · Patient demonstrates good rehab potential due to higher previous functional level. Reason for Services/Other Comments: 
· Patient continues to require present interventions due to patient's inability to perform bed mobility, transfers, ambulation safely and effectively. Use of outcome tool(s) and clinical judgement create a POC that gives a: Clear prediction of patient's progress: LOW COMPLEXITY  
  
 
 
 
TREATMENT:  
(In addition to Assessment/Re-Assessment sessions the following treatments were rendered) Pre-treatment Symptoms/Complaints:  \"I am going home. \" 
Pain: Initial:  
Pain Intensity 1: 0  Post Session:  Not rated Therapeutic Activity: (    25 Minutes): Therapeutic activities including Chair transfers, stair negotiation, and Ambulation on level ground to improve mobility, strength, balance and activity tolerance. Required minimal Safety awareness training to promote static and dynamic balance in standing. Therapeutic Exercise: ( ):  Exercises per grid below to improve mobility, strength and activity tolerance.   Required minimal visual and verbal cues to promote proper body mechanics. Progressed repetitions and complexity of movement as indicated. Date: 
10-29-18 Date: 
 Date: Activity/Exercise Parameters Parameters Parameters Seated ankle pumps x15 Seated LAQ x15 Seated marching x15 Braces/Orthotics/Lines/Etc:  
· O2 Device: Room air Treatment/Session Assessment:   
· Response to Treatment:  Tolerated with fatigue with mobility · Interdisciplinary Collaboration:  
o Physical Therapy Assistant 
o Registered Nurse · After treatment position/precautions:  
o Up in chair 
o Bed/Chair-wheels locked 
o Bed in low position 
o Call light within reach 
o RN notified · Compliance with Program/Exercises: Will assess as treatment progresses · Recommendations/Intent for next treatment session: \"Next visit will focus on advancements to more challenging activities\". Total Treatment Duration: PT Patient Time In/Time Out Time In: 8294 Time Out: 1015 Reynaldo Dinh, PTA

## 2018-10-31 NOTE — PROGRESS NOTES
Reviewed D/C meds. with Marylee Lisinopril should be resumed at as pt was taking at home. Clarification made with pt he was taking 2.5 mg in AM and 5 mg at night.

## 2018-10-31 NOTE — PROGRESS NOTES
Discharge instructions, follow up appointments and prescriptions reviewed with patient and family. Both verbalize understanding. All personal belongings taken with patient. Family member will drive patient home. Patient escorted to discharge area via wheelchair. Patient is stable at discharge. Rx given for Percocet. PIV and monitor removed.

## 2018-10-31 NOTE — DISCHARGE INSTRUCTIONS
MD Instructions:  Wound care:  - Wash abdominal and groin wounds daily with liquid Dial soap (or other liquid antibacterial soap); use fingers or soft washcloth gently then pat area dry. - Keep incision / staples clean and dry. ** Cover groin wounds with dry gauze. Change gauze at least daily, more frequently when it becomes damp. **  - Do not apply lotions, creams or ointments to incisions. - Tissue adhesive (\"skin glue\") used over groin incision will flake or peel off on its own. - Unique Huh will be removed in the office at your follow-up appointment. Diet:  - As tolerated before surgery. Activity:  - Don't overdo your activity throughout the day for the next 10-14 days - no prolonged standing, no lifting more than 10lb. - Keep legs propped up when not walking.  - No driving while taking narcotics. - Do not drink alcohol while taking narcotics. - May shower - no tub baths, soaking or swimming. Medications:  - Use prescription pain medications if needed; if you do not wish to use narcotic pain medication or require less pain control, you may take acetaminophen (Tylenol, for example) or naproxen (Aleve, for example) following instructions on the label.  - Resume other home medications.     Follow up in the office with Dr. Karen Grigsby on 11/8/2018 at 1:15 PM.    If problems or questions arise, please call our office at [phone]. Aortobifemoral Bypass: What to Expect at Home  Your Recovery  An aortobifemoral bypass is surgery to redirect blood around narrowed or blocked blood vessels in your belly or groin. The surgery is done to increase blood flow to the legs. This may relieve symptoms such as leg pain, numbness, and cramping. You may be able to walk longer distances without leg pain. The doctor used a man-made blood vessel, called a graft, to bypass the narrowed or blocked blood vessels. The graft will carry blood from the aorta to the femoral artery in each thigh.  The aorta is the large blood vessel that carries blood from the heart to the blood vessels in the belly. The femoral arteries are large blood vessels that carry blood from the blood vessels in the belly to the legs. You can expect your belly and groin to be sore for several weeks. You will probably feel more tired than usual for several weeks after surgery. You may be able to do many of your usual activities after 4 to 6 weeks. But you will probably need 2 to 3 months to fully recover, especially if you usually do a lot of physical activities. This care sheet gives you a general idea about how long it will take for you to recover. But each person recovers at a different pace. Follow the steps below to feel better as quickly as possible. How can you care for yourself at home? Activity    · Rest when you feel tired. Getting enough sleep will help you recover.     · Try to walk each day. Start by walking a little more than you did the day before. Bit by bit, increase the amount you walk. Walking boosts blood flow and helps prevent pneumonia and constipation.     · Avoid strenuous activities, such as bicycle riding, jogging, weight lifting, or aerobic exercise, for 6 weeks or until your doctor says it is okay.     · For 6 weeks, avoid lifting anything that would make you strain. This may include a child, heavy grocery bags and milk containers, a heavy briefcase or backpack, cat litter or dog food bags, or a vacuum .     · Hold a pillow over your belly incision when you cough or take deep breaths. This will support your belly and decrease your pain.     · Do breathing exercises at home as instructed by your doctor. This will help prevent pneumonia.     · Ask your doctor when you can drive again.     · You will probably need to take at least 4 to 6 weeks off from work. It depends on the type of work you do and how you feel.     · You may shower as usual. Pat the incisions dry.  Do not take a bath for the first 2 weeks, or until your doctor tells you it is okay.     · Ask your doctor when it is okay for you to have sex. Diet    · You can eat your normal diet. If your stomach is upset, try bland, low-fat foods like plain rice, broiled chicken, toast, and yogurt.     · Drink plenty of fluids (unless your doctor tells you not to).     · You may notice that your bowel movements are not regular right after your surgery. This is common. Try to avoid constipation and straining with bowel movements. You may want to take a fiber supplement every day. If you have not had a bowel movement after a couple of days, ask your doctor about taking a mild laxative. Medicines    · Your doctor will tell you if and when you can restart your medicines. He or she will also give you instructions about taking any new medicines.     · If you take blood thinners, such as warfarin (Coumadin), clopidogrel (Plavix), or aspirin, be sure to talk to your doctor. He or she will tell you if and when to start taking those medicines again. Make sure that you understand exactly what your doctor wants you to do.     · Be safe with medicines. Take your medicines exactly as prescribed. Call your doctor if you think you are having a problem with your medicine.     · Take pain medicines exactly as directed. ? If the doctor gave you a prescription medicine for pain, take it as prescribed. ? If you are not taking a prescription pain medicine, ask your doctor if you can take an over-the-counter medicine.     · If you think your pain medicine is making you sick to your stomach:  ? Take your medicine after meals (unless your doctor has told you not to). ? Ask your doctor for a different pain medicine.     · If your doctor prescribed antibiotics, take them as directed. Do not stop taking them just because you feel better. You need to take the full course of antibiotics.     · Your doctor may prescribe a blood thinner when you go home. This helps prevent blood clots.  Be sure you get instructions about how to take your medicine safely. Blood thinners can cause serious bleeding problems. Incision care    · If you have strips of tape on the incisions, leave the tape on for a week or until it falls off.     · Wash the area daily with warm, soapy water, and pat it dry. Don't use hydrogen peroxide or alcohol, which can slow healing. You may cover the area with a gauze bandage if it weeps or rubs against clothing. Change the bandage every day.     · Keep the area clean and dry. Follow-up care is a key part of your treatment and safety. Be sure to make and go to all appointments, and call your doctor if you are having problems. It's also a good idea to know your test results and keep a list of the medicines you take. When should you call for help? Call 911 anytime you think you may need emergency care. For example, call if:    · You passed out (lost consciousness).     · You have severe trouble breathing.     · You have sudden chest pain and shortness of breath, or you cough up blood.     · You have severe pain in your belly.     · You have chest pain or pressure. This may occur with:  ? Sweating. ? Shortness of breath. ? Nausea or vomiting. ? Pain that spreads from the chest to the neck, jaw, or one or both shoulders or arms. ? Dizziness or lightheadedness. ? A fast or uneven pulse. After calling 911, chew 1 adult-strength aspirin. Wait for an ambulance.  Do not try to drive yourself.    Call your doctor now or seek immediate medical care if:    · You have severe pain in your leg, or it becomes cold, pale, blue, tingly, or numb.     · You are sick to your stomach or cannot keep fluids down.     · You have pain that does not get better after you take pain medicine.     · You have a fever over 100°F.     · You have loose stitches, or your incisions come open.     · Bright red blood has soaked through the bandage over any of your incisions.     · You have signs of infection, such as:  ? Increased pain, swelling, warmth, or redness. ? Red streaks leading from the incision. ? Pus draining from the incision. ? Swollen lymph nodes in your neck, armpits, or groin. ? A fever.     · You have signs of a blood clot, such as:  ? Pain in your calf, back of the knee, thigh, or groin. ? Redness and swelling in your leg or groin.    Watch closely for any changes in your health, and be sure to contact your doctor if:    · You do not have a bowel movement after taking a laxative. Where can you learn more? Go to http://regine-marcelo.info/. Enter N799 in the search box to learn more about \"Aortobifemoral Bypass: What to Expect at Home. \"  Current as of: December 6, 2017  Content Version: 11.8  © 7374-2352 [employer]. Care instructions adapted under license by [employer] (which disclaims liability or warranty for this information). If you have questions about a medical condition or this instruction, always ask your healthcare professional. [place] [address] any warranty or liability for your use of this information. DISCHARGE SUMMARY from Nurse    PATIENT INSTRUCTIONS:    After general anesthesia or intravenous sedation, for 24 hours or while taking prescription Narcotics:  · Limit your activities  · Do not drive and operate hazardous machinery  · Do not make important personal or business decisions  · Do  not drink alcoholic beverages  · If you have not urinated within 8 hours after discharge, please contact your surgeon on call.     Report the following to your surgeon:  · Excessive pain, swelling, redness or odor of or around the surgical area  · Temperature over 100.5  · Nausea and vomiting lasting longer than 4 hours or if unable to take medications  · Any signs of decreased circulation or nerve impairment to extremity: change in color, persistent  numbness, tingling, coldness or increase pain  · Any questions    What to do at Home:  *  Please give a list of your current medications to your Primary Care Provider. *  Please update this list whenever your medications are discontinued, doses are      changed, or new medications (including over-the-counter products) are added. *  Please carry medication information at all times in case of emergency situations. These are general instructions for a healthy lifestyle:    No smoking/ No tobacco products/ Avoid exposure to second hand smoke  Surgeon General's Warning:  Quitting smoking now greatly reduces serious risk to your health. Obesity, smoking, and sedentary lifestyle greatly increases your risk for illness    A healthy diet, regular physical exercise & weight monitoring are important for maintaining a healthy lifestyle    You may be retaining fluid if you have a history of heart failure or if you experience any of the following symptoms:  Weight gain of 3 pounds or more overnight or 5 pounds in a week, increased swelling in our hands or feet or shortness of breath while lying flat in bed. Please call your doctor as soon as you notice any of these symptoms; do not wait until your next office visit. Recognize signs and symptoms of STROKE:    F-face looks uneven    A-arms unable to move or move unevenly    S-speech slurred or non-existent    T-time-call 911 as soon as signs and symptoms begin-DO NOT go       Back to bed or wait to see if you get better-TIME IS BRAIN. Warning Signs of HEART ATTACK     Call 911 if you have these symptoms:   Chest discomfort. Most heart attacks involve discomfort in the center of the chest that lasts more than a few minutes, or that goes away and comes back. It can feel like uncomfortable pressure, squeezing, fullness, or pain.  Discomfort in other areas of the upper body. Symptoms can include pain or discomfort in one or both arms, the back, neck, jaw, or stomach.  Shortness of breath with or without chest discomfort.    Other signs may include breaking out in a cold sweat, nausea, or lightheadedness. Don't wait more than five minutes to call 911 - MINUTES MATTER! Fast action can save your life. Calling 911 is almost always the fastest way to get lifesaving treatment. Emergency Medical Services staff can begin treatment when they arrive -- up to an hour sooner than if someone gets to the hospital by car. The discharge information has been reviewed with the patient and spouse. The patient and spouse verbalized understanding. Discharge medications reviewed with the patient and spouse and appropriate educational materials and side effects teaching were provided.   ___________________________________________________________________________________________________________________________________

## 2020-02-13 PROBLEM — R20.0 LEFT LEG NUMBNESS: Status: ACTIVE | Noted: 2020-02-13

## 2020-09-10 PROBLEM — N52.9 ERECTILE DYSFUNCTION: Status: ACTIVE | Noted: 2020-09-10

## 2022-01-26 PROBLEM — I65.23 CAROTID STENOSIS, ASYMPTOMATIC, BILATERAL: Status: ACTIVE | Noted: 2022-01-26

## 2022-03-18 PROBLEM — I65.23 CAROTID STENOSIS, ASYMPTOMATIC, BILATERAL: Status: ACTIVE | Noted: 2022-01-26

## 2022-03-18 PROBLEM — Z95.828 S/P AORTOBIFEMORAL BYPASS SURGERY: Status: ACTIVE | Noted: 2018-10-31

## 2022-03-18 PROBLEM — I74.09 AORTOILIAC OCCLUSIVE DISEASE (HCC): Status: ACTIVE | Noted: 2018-10-29

## 2022-03-19 PROBLEM — N52.9 ERECTILE DYSFUNCTION: Status: ACTIVE | Noted: 2020-09-10

## 2022-03-19 PROBLEM — I73.9 PAD (PERIPHERAL ARTERY DISEASE) (HCC): Status: ACTIVE | Noted: 2018-10-26

## 2022-03-19 PROBLEM — F17.209 TOBACCO USE DISORDER, CONTINUOUS: Status: ACTIVE | Noted: 2018-10-29

## 2022-03-19 PROBLEM — R20.0 LEFT LEG NUMBNESS: Status: ACTIVE | Noted: 2020-02-13

## 2022-07-27 ENCOUNTER — OFFICE VISIT (OUTPATIENT)
Dept: VASCULAR SURGERY | Age: 57
End: 2022-07-27
Payer: COMMERCIAL

## 2022-07-27 VITALS
DIASTOLIC BLOOD PRESSURE: 78 MMHG | HEART RATE: 100 BPM | BODY MASS INDEX: 21.37 KG/M2 | SYSTOLIC BLOOD PRESSURE: 130 MMHG | OXYGEN SATURATION: 100 % | HEIGHT: 68 IN | TEMPERATURE: 97.3 F | WEIGHT: 141 LBS

## 2022-07-27 DIAGNOSIS — Z95.828 S/P AORTOBIFEMORAL BYPASS SURGERY: ICD-10-CM

## 2022-07-27 DIAGNOSIS — F17.209 TOBACCO USE DISORDER, CONTINUOUS: Primary | ICD-10-CM

## 2022-07-27 DIAGNOSIS — I73.9 PAD (PERIPHERAL ARTERY DISEASE) (HCC): ICD-10-CM

## 2022-07-27 PROCEDURE — 99213 OFFICE O/P EST LOW 20 MIN: CPT | Performed by: NURSE PRACTITIONER

## 2022-07-27 ASSESSMENT — PATIENT HEALTH QUESTIONNAIRE - PHQ9
SUM OF ALL RESPONSES TO PHQ9 QUESTIONS 1 & 2: 0
SUM OF ALL RESPONSES TO PHQ QUESTIONS 1-9: 0
SUM OF ALL RESPONSES TO PHQ QUESTIONS 1-9: 0
1. LITTLE INTEREST OR PLEASURE IN DOING THINGS: 0
SUM OF ALL RESPONSES TO PHQ QUESTIONS 1-9: 0
2. FEELING DOWN, DEPRESSED OR HOPELESS: 0
SUM OF ALL RESPONSES TO PHQ QUESTIONS 1-9: 0

## 2022-07-28 NOTE — PROGRESS NOTES
DATE OF VISIT: 7/27/2022      [facility]  Yari Jackson is a 61 y.o. male who follows up for his 6 month arterial duplex study. He denies any lifestyle limiting claudication, rest pain or open ulcerations. He remains on Plavix. He unfortunately continues to smoke. He has undergone an aortobifem in 2018. MEDICAL HISTORY:   Past Medical History:   Diagnosis Date    Anxiety disorder     Chronic pain     right hip    CVA (cerebral vascular accident) ([facility] [address] 03/2017    left sided weakness--- thought to be r/t htn    Depression     Hernia, inguinal     Not repaired    Hypercholesterolemia     Hypertension     controlled with med    PAD (peripheral artery disease) ([facility] [address]     Trauma 17 yrs ago    to left leg-- artery was crushed at work         SURGICAL HISTORY:   Past Surgical History:   Procedure Laterality Date    VASCULAR SURGERY Left last one placed 17 yrs ago    LE stenting x4    VASCULAR SURGERY  10/26/2018    aortobifemoral bypass       Review of Systems:  Constitutional:   Negative for fevers and unexplained weight loss. Respiratory:   Negative for hemoptysis. Cardiovascular:   Negative except as noted in HPI. Musculoskeletal:  Negative for active, unexplained/severe joint pain. ALLERGIES: No Known Allergies    CURRENT MEDICATIONS:  Current Outpatient Medications   Medication Sig Dispense Refill    citalopram (CELEXA) 40 MG tablet Take 40 mg by mouth daily      clonazePAM (KLONOPIN) 1 MG tablet Take 1 mg by mouth 2 times daily.       clopidogrel (PLAVIX) 75 MG tablet Take 75 mg by mouth daily      cyclobenzaprine (FLEXERIL) 10 MG tablet Take 10 mg by mouth 3 times daily as needed      lisinopril (PRINIVIL;ZESTRIL) 2.5 MG tablet Take 2.5 mg by mouth 2 times daily      metoprolol tartrate (LOPRESSOR) 25 MG tablet Take 25 mg by mouth 2 times daily      nitroGLYCERIN (NITROSTAT) 0.4 MG SL tablet Place 0.4 mg under the tongue      baclofen (LIORESAL) 10 MG tablet Take 10 mg by mouth 3 times daily as needed (Patient not taking: Reported on 7/27/2022)      gabapentin (NEURONTIN) 300 MG capsule Take 300 mg by mouth 3 times daily. (Patient not taking: Reported on 7/27/2022)       No current facility-administered medications for this visit. IMAGING: Interpretation Summary    Right lower extremity: The DIPIKA (ankle-brachial index) is 1.2 and is within normal limits. The lower extremity arterial duplex reveals mild, diffuse atherosclerosis without significant stenosis or occlusion. The great toe pressure is 64 mmHg. Left lower extremity: The DIPIKA (ankle-brachial index) is 0.94 and is abnormal. The lower extremity arterial duplex reveals mild, diffuse atherosclerosis. The great toe pressure is 90 mmHg. The Aortobifemoral bypass(10-) appears patent with intact anastomoses and no evidence of false aneurysm. Procedure Details    A gray scale, color and Doppler analysis ultrasound was performed. Continuous wave doppler, pulsed wave doppler, pulsed volume recording (PVR) and photo plethysmography was performed. Overall the study quality was good. Lower Extremity Arterial Findings      Right Lower Arterial    Biphasic Doppler waveforms in the right anterior tibial and peroneal artery. Triphasic Doppler waveforms in the right proximal common femoral, profunda femoris, proximal superficial femoral, middle superficial femoral, distal superficial femoral, proximal popliteal, distal popliteal and posterior tibial artery. Left Lower Arterial    Biphasic Doppler waveforms in the left posterior tibial and peroneal artery. Triphasic Doppler waveforms in the left proximal common femoral, profunda femoris, proximal superficial femoral, middle superficial femoral, distal superficial femoral, proximal popliteal, distal popliteal and anterior tibial artery.                                                Right Lower Arterial Measurements     PSV Wayne Ratio   CFA Prox 198 cm/s           PFA Prox 79.7 cm/s SFA Prox 150 cm/s        0.8          SFA Mid 80.2 cm/s        0.5          SFA Dist 62.9 cm/s        0.78          Pop Prox 53.8 cm/s        0.86          Pop Dist 54.8 cm/s              PTA Dist 78.9 cm/s           VARUN Dist 26.3 cm/s           Peroneal Dist 35.5 cm/s               Left Lower Arterial Measurements     PSV Wayne Ratio   CFA Prox 108 cm/s           PFA Prox 262 cm/s           SFA Prox 211 cm/s        1.95          SFA Mid 97.2 cm/s        0.46          SFA Dist 94.4 cm/s        0.97          Pop Prox 55.4 cm/s        0.59          Pop Dist 67.2 cm/s              PTA Dist 21.6 cm/s           VARUN Dist 49.9 cm/s           Peroneal Dist 41.1 cm/s               Arterial Pressure Measurements     Right Left   Brachial  mmHg        101 mmHg          Post Tibial  mmHg        98 mmHg          DIPIKA 1.18 ratio        0.94 ratio          Toe Pressure 64 mmHg        90 mmHg          TBI 0.62 ratio        0.87 ratio                    Right Graft/Stent 1 Measurements     PSV   Name AORTA TO RT CFA          Inflow Artery 43 cm/s          Prox Anast 106 cm/s          Prox 96 cm/s          Mid 46 cm/s          Dist 78 cm/s          Dist Anast 191 cm/s          Outflow Vessel 198 cm/s                        Left Graft/Stent 1 Measurements     PSV EDV   Name AORTA TO LT CFA           Inflow Artery 43 cm/s              Prox Anast 101 cm/s              Prox 97 cm/s              Mid 54 cm/s              Dist 72 cm/s              Dist Anast 120 cm/s              Outflow Vessel 108 cm/s        11.5 cm/s                          Procedure Staff    Technologist/Clinician: Ralph Roach  Supporting Staff: None  Performing Physician/Midlevel: None     Exam Completion Date/Time: 7/27/22  3:52 PM      PHYSICAL EXAM  VITALS: /78 (Site: Left Upper Arm, Position: Sitting, Cuff Size: Medium Adult)   Pulse 100   Temp 97.3 °F (36.3 °C) (Temporal)   Ht 5' 8\" (1.727 m)   Wt 141 lb (64 kg)   SpO2 100% BMI 21.44 kg/m²       GENERAL: Well developed, well nourished, in NAD  HEAD/NECK: normocephalic, atraumatic, neck supple  HEART: Regular rate and rhythm  ABDOMEN: soft, nontender, nondistended  EXTREMITIES: upper without CCE with palpable radial and brachial pulses. Lower extremities: palpable distal pulses  MUSCULOSKELETAL: normal gait  NEURO: sensation and strength grossly intact and symmetrical  PSYCH: alert and oriented to person, place and time      Assessment/Plan: Patient is a 61year old male who follows up for his 6 month arterial duplex study. He denies any lifestyle limiting claudication, rest pain or open ulcerations. Duplex study is stable, aortobifem is patent. I have strongly counseled him on the need for complete smoking cessations. He is to continue his Plavix. I will have him return in 6 months for a repeat arterial duplex study along with a carotid duplex study. He is to call with any lifestyle limiting claudication, rest pain or open ulcerations in the interim. Formation of plan with Dr. Ryan Cantrell.         Elena Gallego, APRN - CNP    20 minutes of time was spent on this encounter including chart review, assessment and evaluation

## 2023-07-26 ENCOUNTER — OFFICE VISIT (OUTPATIENT)
Dept: VASCULAR SURGERY | Age: 58
End: 2023-07-26
Payer: COMMERCIAL

## 2023-07-26 VITALS
DIASTOLIC BLOOD PRESSURE: 68 MMHG | OXYGEN SATURATION: 96 % | BODY MASS INDEX: 21.22 KG/M2 | SYSTOLIC BLOOD PRESSURE: 108 MMHG | HEIGHT: 68 IN | WEIGHT: 140 LBS | HEART RATE: 67 BPM

## 2023-07-26 DIAGNOSIS — I74.09 AORTOILIAC OCCLUSIVE DISEASE (HCC): ICD-10-CM

## 2023-07-26 DIAGNOSIS — F17.209 TOBACCO USE DISORDER, CONTINUOUS: Primary | ICD-10-CM

## 2023-07-26 DIAGNOSIS — I10 HYPERTENSION, UNSPECIFIED TYPE: ICD-10-CM

## 2023-07-26 DIAGNOSIS — M25.551 RIGHT HIP PAIN: ICD-10-CM

## 2023-07-26 DIAGNOSIS — I73.9 PAD (PERIPHERAL ARTERY DISEASE) (HCC): ICD-10-CM

## 2023-07-26 PROCEDURE — 3078F DIAST BP <80 MM HG: CPT | Performed by: NURSE PRACTITIONER

## 2023-07-26 PROCEDURE — 3074F SYST BP LT 130 MM HG: CPT | Performed by: NURSE PRACTITIONER

## 2023-07-26 PROCEDURE — 99214 OFFICE O/P EST MOD 30 MIN: CPT | Performed by: NURSE PRACTITIONER

## 2023-07-26 NOTE — PROGRESS NOTES
to continue his Plavix. I will have him return in 6 months for a repeat arterial duplex study along with a carotid duplex study. I will call him once the CTA is completed if I have further recommendations. I will place a referral to cardiology in [address] for his HTN and orthopedics for his right hip pain.          Elena Gallego, APRN - CNP    30 minutes of time was spent on this encounter including chart review, assessment and evaluation

## 2023-11-08 ENCOUNTER — TELEPHONE (OUTPATIENT)
Dept: VASCULAR SURGERY | Age: 58
End: 2023-11-08

## 2023-11-08 NOTE — TELEPHONE ENCOUNTER
Voicemail message left on nurse line to call caller because she had vascular questions - requested NP return call. Unable to understand patient name that was left on voicemail. Returned call and patient answered, stated that previous call was made by \"Angélica\". Patient states he had an appointment with his cardiologist that ran a heart catheterization on 10/19/23 - patient told he should contact Vascular Surgery office since the test was normal.  Patient wants to have an ultrasound scan, has had numbness and cold feeling in his left leg for years (patient last seen 7/2023). Per NP, patient should have an arterial ultrasound and see Dr Vasquez Groves next week. Patient offered several dates to be seen and dates were refused due to his schedule. Patient agreed to come in on 11/27. Reminded patient that CTA had been ordered at last appointment by NP and it needs to get completed - 2 messages have been left reminding pt to schedule. Patient states that he has not been that way to schedule. Offered phone number to Radiology - pt will try to schedule prior to his 11/27 appointment.

## 2023-11-21 ENCOUNTER — HOSPITAL ENCOUNTER (OUTPATIENT)
Dept: CT IMAGING | Age: 58
Discharge: HOME | End: 2023-11-24
Payer: COMMERCIAL

## 2023-11-21 DIAGNOSIS — I74.09 AORTOILIAC OCCLUSIVE DISEASE (HCC): ICD-10-CM

## 2023-11-21 DIAGNOSIS — I73.9 PAD (PERIPHERAL ARTERY DISEASE) (HCC): ICD-10-CM

## 2023-11-21 LAB — CREAT BLD-MCNC: 1 MG/DL (ref 0.8–1.5)

## 2023-11-21 PROCEDURE — 6360000004 HC RX CONTRAST MEDICATION: Performed by: NURSE PRACTITIONER

## 2023-11-21 PROCEDURE — 82565 ASSAY OF CREATININE: CPT

## 2023-11-21 PROCEDURE — 75635 CT ANGIO ABDOMINAL ARTERIES: CPT

## 2023-11-21 RX ADMIN — IOPAMIDOL 100 ML: 755 INJECTION, SOLUTION INTRAVENOUS at 13:36

## 2023-11-27 ENCOUNTER — OFFICE VISIT (OUTPATIENT)
Dept: VASCULAR SURGERY | Age: 58
End: 2023-11-27
Payer: COMMERCIAL

## 2023-11-27 VITALS
OXYGEN SATURATION: 96 % | DIASTOLIC BLOOD PRESSURE: 67 MMHG | SYSTOLIC BLOOD PRESSURE: 105 MMHG | BODY MASS INDEX: 21.67 KG/M2 | WEIGHT: 143 LBS | HEART RATE: 84 BPM | HEIGHT: 68 IN

## 2023-11-27 DIAGNOSIS — I73.9 PAD (PERIPHERAL ARTERY DISEASE) (HCC): Primary | ICD-10-CM

## 2023-11-27 DIAGNOSIS — F17.209 TOBACCO USE DISORDER, CONTINUOUS: ICD-10-CM

## 2023-11-27 PROCEDURE — 99214 OFFICE O/P EST MOD 30 MIN: CPT | Performed by: NURSE PRACTITIONER

## 2023-11-27 NOTE — PROGRESS NOTES
DATE OF VISIT: 11/27/2023      [facility]  Clemente Nichole is a 60 y.o. male who follows up for his recent CTA and arterial duplex study. He c/o left leg weakness and coldness. He has a history of an aortobifem in 2018. He remains on Plavix. He unfortunately continues to smoke. 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 Has PET scan scheduled for this Wednesday at [address] also underwent a colonoscopy with multiple biopsies [facility] at [facility]. GI recommended f/u with vascular as there was some concern for flow to the colon. MEDICAL HISTORY:   Past Medical History:   Diagnosis Date    Anxiety disorder     Chronic pain     right hip    CVA (cerebral vascular accident) ([address]) 03/2017    left sided weakness--- thought to be r/t htn    Depression     Hernia, inguinal     Not repaired    Hypercholesterolemia     Hypertension     controlled with med    PAD (peripheral artery disease) ([address])     Trauma 17 yrs ago    to left leg-- artery was crushed at work         SURGICAL HISTORY:   Past Surgical History:   Procedure Laterality Date    VASCULAR SURGERY Left last one placed 17 yrs ago    LE stenting x4    VASCULAR SURGERY  10/26/2018    aortobifemoral bypass       Review of Systems:  Constitutional:   Negative for fevers and unexplained weight loss. Respiratory:   Negative for hemoptysis. Cardiovascular:   Negative except as noted in HPI. Musculoskeletal:  Negative for active, unexplained/severe joint pain. ALLERGIES: No Known Allergies    CURRENT MEDICATIONS:  Current Outpatient Medications   Medication Sig Dispense Refill    Cholecalciferol (VITAMIN D-3 PO) Take by mouth      citalopram (CELEXA) 40 MG tablet Take 1 tablet by mouth daily      clonazePAM (KLONOPIN) 1 MG tablet Take 1 tablet by mouth 2 times daily.       clopidogrel (PLAVIX) 75 MG tablet Take 1 tablet by mouth daily      lisinopril (PRINIVIL;ZESTRIL) 2.5 MG tablet Take

## 2024-06-03 ENCOUNTER — OFFICE VISIT (OUTPATIENT)
Dept: VASCULAR SURGERY | Age: 59
End: 2024-06-03
Payer: COMMERCIAL

## 2024-06-03 VITALS
HEIGHT: 68 IN | DIASTOLIC BLOOD PRESSURE: 62 MMHG | HEART RATE: 78 BPM | WEIGHT: 140 LBS | OXYGEN SATURATION: 95 % | SYSTOLIC BLOOD PRESSURE: 98 MMHG | BODY MASS INDEX: 21.22 KG/M2

## 2024-06-03 DIAGNOSIS — F17.209 TOBACCO USE DISORDER, CONTINUOUS: ICD-10-CM

## 2024-06-03 DIAGNOSIS — I65.23 CAROTID STENOSIS, ASYMPTOMATIC, BILATERAL: Primary | ICD-10-CM

## 2024-06-03 DIAGNOSIS — Z95.828 S/P AORTOBIFEMORAL BYPASS SURGERY: ICD-10-CM

## 2024-06-03 DIAGNOSIS — I73.9 PAD (PERIPHERAL ARTERY DISEASE) (HCC): ICD-10-CM

## 2024-06-03 PROCEDURE — 99214 OFFICE O/P EST MOD 30 MIN: CPT | Performed by: NURSE PRACTITIONER

## 2024-06-03 RX ORDER — ESCITALOPRAM OXALATE 10 MG/1
10 TABLET ORAL DAILY
COMMUNITY
Start: 2024-04-29

## 2024-06-03 NOTE — PROGRESS NOTES
cm/s       31.3 cm/s       77.4 cm/s       23.8 cm/s         ICA Prox 69 cm/s       12.6 cm/s       90.4 cm/s       36.7 cm/s         ICA Mid 84.6 cm/s       30.6 cm/s       93.1 cm/s       32.6 cm/s         ICA Dist 104 cm/s       38.4 cm/s       88.3 cm/s       32.6 cm/s         ICA/CCA Ratio 1.16        1.2          ECA 98.5 cm/s       21.1 cm/s       82.2 cm/s       13.6 cm/s         Vertebral 32.2 cm/s       6.52 cm/s       74.1 cm/s       21.1 cm/s           Arterial Pressure Measurements     Right Left   Brachial 112 mmHg       98 mmHg               PHYSICAL EXAM  VITALS: BP 98/62 (Site: Left Upper Arm, Position: Sitting, Cuff Size: Large Adult)   Pulse 78   Ht 1.727 m (5' 8\")   Wt 63.5 kg (140 lb)   SpO2 95%   BMI 21.29 kg/m²       GENERAL: Well developed, well nourished, in NAD  HEAD/NECK: normocephalic, atraumatic, neck supple  HEART: Regular rate and rhythm  ABDOMEN: soft, nontender, nondistended  EXTREMITIES: upper without CCE with palpable radial and brachial pulses.  Lower extremities: palpable DP pulses  MUSCULOSKELETAL: normal gait  NEURO: sensation and strength grossly intact and symmetrical  PSYCH: alert and oriented to person, place and time      Assessment/Plan:Patient is a 58 year old male who follows up for his arterial duplex study. He c/o left leg weakness and pain to his thigh. H/o aortobifem in 2018. Duplex study is stable.  Unsure the etiology of his leg weakness and pain. He denies back pain. He is going to discuss hernia with PCP. Carotid duplex does not show any significant carotid disease. I have strongly counseled him on the need for complete smoking cessation. Return in 6 months along with a carotid and arterial duplex, sooner should any issues arise. He is to present to the ER with any S/S of a stroke ie: slurred speech, facial drooping, amaurosis fugax or unilateral weakness.         Elena Gallego, APRN - CNP    30 minutes of time was spent on this encounter including

## (undated) DEVICE — SPONGE LAP 18X18IN STRL -- 5/PK

## (undated) DEVICE — PLEDGET VASC W3/16XL3/8IN THK1/16IN PTFE SFT

## (undated) DEVICE — SUTURE VCRL SZ 4-0 L27IN ABSRB UD L19MM PS-2 3/8 CIR PRIM J426H

## (undated) DEVICE — 3M™ IOBAN™ 2 ANTIMICROBIAL INCISE DRAPE 6648EZ: Brand: IOBAN™ 2

## (undated) DEVICE — SUTURE PERMAHAND SZ 2-0 L30IN 10X30IN TIE NONABSORBABLE BLK SA85H

## (undated) DEVICE — BLADE ASSEMB CLP HAIR FINE --

## (undated) DEVICE — BLADE SCALP SURG BARD-PARK 10 --

## (undated) DEVICE — SUTURE ETHBND EXCEL SZ 0 L18IN NONABSORBABLE GRN L36MM CT-1 CX21D

## (undated) DEVICE — SUT PROL 3-0 36IN SH DA BLU --

## (undated) DEVICE — DRAPE SURG EQUIP DISC 66X44 -- USE ITEM 141557

## (undated) DEVICE — SUTURE PDS II SZ 1 L96IN ABSRB VLT TP-1 L65MM 1/2 CIR Z880G

## (undated) DEVICE — UNIVERSAL DRAPES: Brand: MEDLINE INDUSTRIES, INC.

## (undated) DEVICE — SUTURE VCRL SZ 3-0 L27IN ABSRB UD L26MM SH 1/2 CIR J416H

## (undated) DEVICE — 2000CC GUARDIAN II: Brand: GUARDIAN

## (undated) DEVICE — INTENDED FOR TISSUE SEPARATION, AND OTHER PROCEDURES THAT REQUIRE A SHARP SURGICAL BLADE TO PUNCTURE OR CUT.: Brand: BARD-PARKER SAFETY BLADES SIZE 10, STERILE

## (undated) DEVICE — INTENDED FOR TISSUE SEPARATION, AND OTHER PROCEDURES THAT REQUIRE A SHARP SURGICAL BLADE TO PUNCTURE OR CUT.: Brand: BARD-PARKER ® STAINLESS STEEL BLADES

## (undated) DEVICE — TAPE UMB 1/8X30IN MP COT WHT --

## (undated) DEVICE — BUTTON SWITCH PENCIL BLADE ELECTRODE, HOLSTER: Brand: EDGE

## (undated) DEVICE — APPLICATOR BNDG 1MM ADH PREMIERPRO EXOFIN

## (undated) DEVICE — STOCKINETTE TUBE 6X48 -- MEDICHOICE

## (undated) DEVICE — SUTURE PROL SZ 6-0 L24IN NONABSORBABLE BLU L13MM C-1 3/8 8726H

## (undated) DEVICE — Device

## (undated) DEVICE — INTENDED TO BE USED TO OCCLUDE, RETRACT AND IDENTIFY ARTERIES, VEINS, TENDONS AND NERVES IN SURGICAL PROCEDURES: Brand: STERION®  VESSEL LOOP

## (undated) DEVICE — DISH PTRI STRL --

## (undated) DEVICE — (D)ADHESIVE TISS HI VISC 1ML -- DISC USE ITEM 346585

## (undated) DEVICE — SUTURE PROL SZ 5-0 L24IN NONABSORBABLE BLU L13MM C-1 3/8 M8725

## (undated) DEVICE — PAD THRM L UNIV NONSLIP HYDRGEL RECT PROVIDE OPTIMAL ENERGY

## (undated) DEVICE — GEL US 20GM NONIRRITATING OVERWRAPPED FILE PCH TRNSMIT

## (undated) DEVICE — STERILE LATEX POWDER-FREE SURGICAL GLOVESWITH NITRILE COATING: Brand: PROTEXIS

## (undated) DEVICE — APPLIER LIG CLP M L11IN TI STR RNG HNDL FOR 20 CLP DISP

## (undated) DEVICE — BLADE ELECTRODE: Brand: EDGE

## (undated) DEVICE — BLADE SCALP SURG BARD-PARK 11 --

## (undated) DEVICE — SYR IRR CATH TIP LR ADPT 70ML -- CONVERT TO ITEM 363120

## (undated) DEVICE — PVC URETHRAL CATHETER: Brand: DOVER

## (undated) DEVICE — SUTURE PERMAHAND SZ 2-0 L30IN NONABSORBABLE BLK SILK W/O A305H

## (undated) DEVICE — SUTURE VCRL SZ 2-0 L36IN ABSRB UD L36MM CT-1 1/2 CIR J945H

## (undated) DEVICE — CATHETER ETER URETH 16FR INTMIT ROB MOD BARDX

## (undated) DEVICE — SUTURE PERMAHAND SZ 2-0 L12X18IN NONABSORBABLE BLK SILK A185H

## (undated) DEVICE — GOWN,PREVENTION PLUS,2XL,ST,22/CS: Brand: MEDLINE

## (undated) DEVICE — SUTURE PROL SZ 7-0 L24IN NONABSORBABLE BLU L9.3MM BV-1 3/8 M8702

## (undated) DEVICE — DRAPE TWL SURG 16X26IN BLU ORB04] ALLCARE INC]

## (undated) DEVICE — GOWN,REINF,POLY,ECL,PP SLV,XL: Brand: MEDLINE

## (undated) DEVICE — SUTURE PERMAHAND SZ 3-0 L30IN NONABSORBABLE BLK W/O NDL SA84H

## (undated) DEVICE — SLIM BODY SKIN STAPLER: Brand: APPOSE ULC

## (undated) DEVICE — STERILE SLEEVE: Brand: CONVERTORS

## (undated) DEVICE — TRAY CATH 16F URIN MTR LTX -- CONVERT TO ITEM 363111

## (undated) DEVICE — SUTURE PERMAHAND SZ 3-0 L30IN NONABSORBABLE BLK SILK BRAID A304H

## (undated) DEVICE — ABSORBENT, WATERPROOF, BACTERIA PROOF FILM DRESSING: Brand: OPSITE POST OP 10X35CM CTN 20

## (undated) DEVICE — AGENT HEMSTAT W4XL4IN OXIDIZED REGENERATED CELOS ABSRB SFT

## (undated) DEVICE — SUTURE NONABSORBABLE L24IN SZ 7-0 M0-5 BV175-8 EP 24 BLU M8745

## (undated) DEVICE — TOWEL SURG W17XL27IN STD BLU COT NONFENESTRATED PREWASHED

## (undated) DEVICE — (D)PREP SKN CHLRAPRP APPL 26ML -- CONVERT TO ITEM 371833

## (undated) DEVICE — APPLIER CLP L9.375IN APER 2.1MM CLS L3.8MM 20 SM TI CLP

## (undated) DEVICE — COVER,TABLE,44X76,STERILE: Brand: MEDLINE

## (undated) DEVICE — MEDI-VAC YANKAUER SUCTION HANDLE W/BULBOUS TIP: Brand: CARDINAL HEALTH

## (undated) DEVICE — ABSORBENT, WATERPROOF, BACTERIA PROOF FILM DRESSING: Brand: OPSITE POST OP 9.5X8.5CM CTN 20

## (undated) DEVICE — SUTURE PERMAHAND SZ 2-0 L30IN NONABSORBABLE BLK SH L26MM C016D

## (undated) DEVICE — REM POLYHESIVE ADULT PATIENT RETURN ELECTRODE: Brand: VALLEYLAB

## (undated) DEVICE — SUTURE PERMAHAND SZ 3-0 L18IN NONABSORBABLE BLK SILK BRAID A184H

## (undated) DEVICE — 3M™ IOBAN™ 2 ANTIMICROBIAL INCISE DRAPE 6651EZ: Brand: IOBAN™ 2

## (undated) DEVICE — TELFA NON-ADHERENT ABSORBENT DRESSING: Brand: TELFA

## (undated) DEVICE — CARDINAL HEALTH FLEXIBLE LIGHT HANDLE COVER: Brand: CARDINAL HEALTH

## (undated) DEVICE — SUTURE PROL SZ 3-0 L48IN NONABSORBABLE BLU SH L26MM 1/2 CIR 8534H

## (undated) DEVICE — SUT PROL 3-0 36IN MH DA BLU --